# Patient Record
Sex: FEMALE | Race: WHITE | NOT HISPANIC OR LATINO | URBAN - METROPOLITAN AREA
[De-identification: names, ages, dates, MRNs, and addresses within clinical notes are randomized per-mention and may not be internally consistent; named-entity substitution may affect disease eponyms.]

---

## 2023-04-12 ENCOUNTER — OFFICE VISIT (OUTPATIENT)
Dept: PRIMARY CARE | Facility: CLINIC | Age: 38
End: 2023-04-12
Payer: COMMERCIAL

## 2023-04-12 VITALS
HEIGHT: 65 IN | BODY MASS INDEX: 31.49 KG/M2 | TEMPERATURE: 98.3 F | DIASTOLIC BLOOD PRESSURE: 65 MMHG | OXYGEN SATURATION: 95 % | HEART RATE: 87 BPM | WEIGHT: 189 LBS | SYSTOLIC BLOOD PRESSURE: 100 MMHG

## 2023-04-12 DIAGNOSIS — R53.83 FATIGUE, UNSPECIFIED TYPE: ICD-10-CM

## 2023-04-12 DIAGNOSIS — R06.02 SOB (SHORTNESS OF BREATH): Primary | ICD-10-CM

## 2023-04-12 DIAGNOSIS — R51.9 INCREASED FREQUENCY OF HEADACHES: ICD-10-CM

## 2023-04-12 DIAGNOSIS — R00.0 TACHYCARDIA: ICD-10-CM

## 2023-04-12 DIAGNOSIS — R11.0 NAUSEA: ICD-10-CM

## 2023-04-12 PROBLEM — E28.2 PCOS (POLYCYSTIC OVARIAN SYNDROME): Status: RESOLVED | Noted: 2023-04-12 | Resolved: 2023-04-12

## 2023-04-12 PROBLEM — J45.20 MILD INTERMITTENT ASTHMA WITHOUT COMPLICATION (HHS-HCC): Status: RESOLVED | Noted: 2023-04-12 | Resolved: 2023-04-12

## 2023-04-12 PROBLEM — F41.9 ANXIETY: Status: ACTIVE | Noted: 2023-04-12

## 2023-04-12 PROBLEM — F32.A DEPRESSION: Status: ACTIVE | Noted: 2023-04-12

## 2023-04-12 PROBLEM — E28.2 PCOS (POLYCYSTIC OVARIAN SYNDROME): Status: ACTIVE | Noted: 2023-04-12

## 2023-04-12 PROBLEM — F32.A DEPRESSION: Status: RESOLVED | Noted: 2023-04-12 | Resolved: 2023-04-12

## 2023-04-12 PROBLEM — F41.9 ANXIETY: Status: RESOLVED | Noted: 2023-04-12 | Resolved: 2023-04-12

## 2023-04-12 PROBLEM — J45.20 MILD INTERMITTENT ASTHMA WITHOUT COMPLICATION (HHS-HCC): Status: ACTIVE | Noted: 2023-04-12

## 2023-04-12 LAB
NON-UH HIE A/G RATIO: 1.5
NON-UH HIE ALB: 4.1 G/DL (ref 3.4–5)
NON-UH HIE ALK PHOS: 47 UNIT/L (ref 46–116)
NON-UH HIE BASO COUNT: 0.09 X1000 (ref 0–0.2)
NON-UH HIE BASOS %: 1.1 %
NON-UH HIE BILIRUBIN, TOTAL: 0.2 MG/DL (ref 0.2–1)
NON-UH HIE BUN/CREAT RATIO: 17.8
NON-UH HIE BUN: 16 MG/DL (ref 9–23)
NON-UH HIE CALCIUM: 9.6 MG/DL (ref 8.7–10.4)
NON-UH HIE CALCULATED OSMOLALITY: 280 MOSM/KG (ref 275–295)
NON-UH HIE CHLORIDE: 107 MMOL/L (ref 98–107)
NON-UH HIE CO2, VENOUS: 26 MMOL/L (ref 20–31)
NON-UH HIE CREATININE: 0.9 MG/DL (ref 0.5–0.8)
NON-UH HIE DIFF?: NO
NON-UH HIE DXH ACTIONS: ABNORMAL
NON-UH HIE EOS COUNT: 0.61 X1000 (ref 0–0.5)
NON-UH HIE EOSIN %: 7.3 %
NON-UH HIE GFR AA: >60
NON-UH HIE GLOBULIN: 2.7 G/DL
NON-UH HIE GLOMERULAR FILTRATION RATE: >60 ML/MIN/1.73M?
NON-UH HIE GLUCOSE: 80 MG/DL (ref 74–106)
NON-UH HIE GOT: 14 UNIT/L (ref 15–37)
NON-UH HIE GPT: 14 UNIT/L (ref 10–49)
NON-UH HIE HCT: 40.2 % (ref 36–46)
NON-UH HIE HGB: 13 G/DL (ref 12–16)
NON-UH HIE INSTR WBC: 8.3
NON-UH HIE K: 4.1 MMOL/L (ref 3.5–5.1)
NON-UH HIE LYMPH %: 29.6 %
NON-UH HIE LYMPH COUNT: 2.46 X1000 (ref 1.2–4.8)
NON-UH HIE MCH: 24.8 PG (ref 27–34)
NON-UH HIE MCHC: 32.3 G/DL (ref 32–37)
NON-UH HIE MCV: 76.9 FL (ref 80–100)
NON-UH HIE MONO %: 5.3 %
NON-UH HIE MONO COUNT: 0.44 X1000 (ref 0.1–1)
NON-UH HIE MPV: 9.2 FL (ref 7.4–10.4)
NON-UH HIE NA: 140 MMOL/L (ref 135–145)
NON-UH HIE NEUTROPHIL %: 56.7 %
NON-UH HIE NEUTROPHIL COUNT (ANC): 4.7 X1000 (ref 1.4–8.8)
NON-UH HIE NUCLEATED RBC: 0 /100WBC
NON-UH HIE PLATELET: 190 X10 (ref 150–450)
NON-UH HIE RBC: 5.23 X10 (ref 4.2–5.4)
NON-UH HIE RDW: 14.1 % (ref 11.5–14.5)
NON-UH HIE TOTAL PROTEIN: 6.8 G/DL (ref 5.7–8.2)
NON-UH HIE TSH: 0.88 UIU/ML (ref 0.55–4.78)
NON-UH HIE VIT D 25: 22 NG/ML
NON-UH HIE VITAMIN B12: 303 PG/ML (ref 211–911)
NON-UH HIE WBC: 8.3 X10 (ref 4.5–11)

## 2023-04-12 PROCEDURE — 99204 OFFICE O/P NEW MOD 45 MIN: CPT | Performed by: NURSE PRACTITIONER

## 2023-04-12 PROCEDURE — 93000 ELECTROCARDIOGRAM COMPLETE: CPT | Performed by: NURSE PRACTITIONER

## 2023-04-12 PROCEDURE — 1036F TOBACCO NON-USER: CPT | Performed by: NURSE PRACTITIONER

## 2023-04-12 RX ORDER — RIZATRIPTAN BENZOATE 10 MG/1
10 TABLET, ORALLY DISINTEGRATING ORAL ONCE AS NEEDED
Qty: 12 TABLET | Refills: 5 | Status: SHIPPED | OUTPATIENT
Start: 2023-04-12 | End: 2023-05-12

## 2023-04-12 RX ORDER — KETOROLAC TROMETHAMINE 30 MG/ML
60 INJECTION, SOLUTION INTRAMUSCULAR; INTRAVENOUS ONCE
Status: CANCELLED | OUTPATIENT
Start: 2023-04-12 | End: 2023-04-12

## 2023-04-12 RX ORDER — FAMOTIDINE 40 MG/1
TABLET, FILM COATED ORAL
COMMUNITY
Start: 2021-12-22

## 2023-04-12 RX ORDER — ONDANSETRON 4 MG/1
4 TABLET, FILM COATED ORAL EVERY 8 HOURS PRN
Qty: 20 TABLET | Refills: 0 | Status: SHIPPED | OUTPATIENT
Start: 2023-04-12 | End: 2023-04-19

## 2023-04-12 RX ORDER — PANTOPRAZOLE SODIUM 40 MG/1
FOR SUSPENSION ORAL
COMMUNITY

## 2023-04-12 ASSESSMENT — ANXIETY QUESTIONNAIRES
7. FEELING AFRAID AS IF SOMETHING AWFUL MIGHT HAPPEN: NOT AT ALL
GAD7 TOTAL SCORE: 0
5. BEING SO RESTLESS THAT IT IS HARD TO SIT STILL: NOT AT ALL
6. BECOMING EASILY ANNOYED OR IRRITABLE: NOT AT ALL
1. FEELING NERVOUS, ANXIOUS, OR ON EDGE: NOT AT ALL
4. TROUBLE RELAXING: NOT AT ALL
IF YOU CHECKED OFF ANY PROBLEMS ON THIS QUESTIONNAIRE, HOW DIFFICULT HAVE THESE PROBLEMS MADE IT FOR YOU TO DO YOUR WORK, TAKE CARE OF THINGS AT HOME, OR GET ALONG WITH OTHER PEOPLE: NOT DIFFICULT AT ALL
2. NOT BEING ABLE TO STOP OR CONTROL WORRYING: NOT AT ALL
3. WORRYING TOO MUCH ABOUT DIFFERENT THINGS: NOT AT ALL

## 2023-04-12 ASSESSMENT — ENCOUNTER SYMPTOMS
PALPITATIONS: 0
COUGH: 0
FATIGUE: 1
HEADACHES: 1
SHORTNESS OF BREATH: 1
CHILLS: 0
WHEEZING: 0
VOMITING: 1
FEVER: 0
DIZZINESS: 1

## 2023-04-12 ASSESSMENT — PATIENT HEALTH QUESTIONNAIRE - PHQ9
6. FEELING BAD ABOUT YOURSELF - OR THAT YOU ARE A FAILURE OR HAVE LET YOURSELF OR YOUR FAMILY DOWN: NOT AT ALL
3. TROUBLE FALLING OR STAYING ASLEEP OR SLEEPING TOO MUCH: MORE THAN HALF THE DAYS
7. TROUBLE CONCENTRATING ON THINGS, SUCH AS READING THE NEWSPAPER OR WATCHING TELEVISION: NOT AT ALL
10. IF YOU CHECKED OFF ANY PROBLEMS, HOW DIFFICULT HAVE THESE PROBLEMS MADE IT FOR YOU TO DO YOUR WORK, TAKE CARE OF THINGS AT HOME, OR GET ALONG WITH OTHER PEOPLE: SOMEWHAT DIFFICULT
2. FEELING DOWN, DEPRESSED OR HOPELESS: NOT AT ALL
SUM OF ALL RESPONSES TO PHQ9 QUESTIONS 1 AND 2: 0
5. POOR APPETITE OR OVEREATING: NOT AT ALL
4. FEELING TIRED OR HAVING LITTLE ENERGY: MORE THAN HALF THE DAYS
SUM OF ALL RESPONSES TO PHQ QUESTIONS 1-9: 4
2. FEELING DOWN, DEPRESSED OR HOPELESS: NOT AT ALL
1. LITTLE INTEREST OR PLEASURE IN DOING THINGS: NOT AT ALL
1. LITTLE INTEREST OR PLEASURE IN DOING THINGS: NOT AT ALL
8. MOVING OR SPEAKING SO SLOWLY THAT OTHER PEOPLE COULD HAVE NOTICED. OR THE OPPOSITE, BEING SO FIGETY OR RESTLESS THAT YOU HAVE BEEN MOVING AROUND A LOT MORE THAN USUAL: NOT AT ALL
SUM OF ALL RESPONSES TO PHQ9 QUESTIONS 1 AND 2: 0
9. THOUGHTS THAT YOU WOULD BE BETTER OFF DEAD, OR OF HURTING YOURSELF: NOT AT ALL

## 2023-04-12 NOTE — PROGRESS NOTES
Subjective   Patient ID: Lydia Flores is a 37 y.o. female who presents for Migraine and Fatigue (Migraine started about 3-4 weeks ago vomiting, dizzy, sensitive to the light. Tired all the time, started about 2months./Last pcp Sonny not pulling up/Ob gyn do not have one/Last physical -10/022/Fasting-No/Tdap-2011/Flu shot-09/2022/Last pap-2018/Bp-sometimes when she feel like its high/).    new pt-johanna-last pcp-sonny; no ob gyn; sees dr swan    last physical 10/2022  last labs 9/2022 low k, cbc nl; 8/2022 iron nl, a1c 5.2, tsh nl  is pt fasting? no  Tdap 2011; pt will check paperwork at home  flu shot unavailable  last pap 2018; had hyster with cervix out  Bps at home -checks at times  Family history form  Phq9=6, gad7=0      Do ekg if she hasn't had one in the last 1wk      HPI  Tired all the time x 2mon.  No new stresses  In a good place with work and family x 1 1/2 yrs  Able to fall asleep and stay asleep.    On off tachycardia  Sob when exert self  Bps not high when checking; highest 130 systolic  No wheezing, tight chest, or cough  No fever or chills  No chest pain, skips, pause, flutters  No double vision or blurry vision    Migraine started 3-4wks ago-daily.  Also vomiting, dizzy, sensitive to light  h/o migraines on off  7/2022 getting on a daily basis but then went away    Location-frontal, behind eyes and temples  how many days of work/school missed in the last mon-  No aura  Wakes up with headache mostly  no fall or injury  Pain right now 0/10  Pain at worst 10/10  pain worse with light, noise, walking around  pain better with darkness and sleep  worse with activity or exertion    neck pain none  Assoc sx: dizziness, tingling face or hands, runny nose, stuffy nose, post nasal drip, ST, ear pressure, ear pain, fever, chills, cp,  wheezing  no trouble speaking, difficulty understanding speech, slurring words, confusion, paralysis/numbness face, arm, leg, facial drooping, loss of  balance/coordination, gait disturbance, weakness  no fainting or stiff neck or seizures  Last vomit-Monday  Works night shift-nurse  seasonal allergies-none    No new diet  eats 2 meals a day  fluids  Caffeine-quit 2021; quit smoking 2021  new meds or otc meds  FH headaches-none  Selftxt: excedrin migraine, ibuprofen, sleep, ice  No rx meds in the past     Lmp 2015; hyster      No results found for this or any previous visit (from the past 4464 hour(s)).     No care team member to display     Review of Systems   Constitutional:  Positive for fatigue. Negative for chills and fever.   Eyes:         Sensitive to light   Respiratory:  Positive for shortness of breath. Negative for cough and wheezing.    Cardiovascular:  Negative for chest pain and palpitations.        Heart racing   Gastrointestinal:  Positive for vomiting.   Neurological:  Positive for dizziness and headaches.       Objective   Visit Vitals  /65   Pulse 87   Temp 36.8 °C (98.3 °F) (Oral)      BP Readings from Last 3 Encounters:   04/12/23 100/65     Wt Readings from Last 3 Encounters:   04/12/23 85.7 kg (189 lb)       Physical Exam  Constitutional:       General: She is not in acute distress.     Appearance: Normal appearance.   Neurological:      Mental Status: She is alert.         Assessment/Plan   Diagnoses and all orders for this visit:  SOB (shortness of breath)  Tachycardia  -     ECG 12 lead  Fatigue, unspecified type  -     Comprehensive Metabolic Panel; Future  -     TSH with reflex to Free T4 if abnormal; Future  -     CBC and Auto Differential; Future  -     Vitamin D 25-Hydroxy,Total; Future  -     Vitamin B12; Future  Increased frequency of headaches  -     CT head wo IV contrast; Future  -     Referral to Neurology; Future  -     rizatriptan MLT (Maxalt-MLT) 10 mg disintegrating tablet; Take 1 tablet (10 mg) by mouth 1 time if needed for migraine. May repeat in 2 hours if unresolved. Do not exceed 30 mg in 24 hours.  Nausea  -      ondansetron (Zofran) 4 mg tablet; Take 1 tablet (4 mg) by mouth every 8 hours if needed for nausea or vomiting for up to 7 days.

## 2023-04-12 NOTE — PATIENT INSTRUCTIONS
Zyrtec 1 a day x 2wks  Consider antibiotic  rx maxalt for migraine onset  Rx zofran for nausea/vomiting  ct head  neuro dr    Check date of last tdap at home  If more than 10yrs ago, let me know if you would like to update this.    EKG today-normal    Labs today-results in 2-3d  You can use the lab in our office today. Results will be back in 2-3 business days for most labs. It is always recommended for any orders (labs, xrays, ultrasounds,MRI, ct scan, procedures etc) to check with your insurance provider for expected costs or expenses to you.     You will get your results via phone from my medical assistant if you do not have [employer].  OR  You will get your results via the [employer] patient portal.    If a result is urgent, I will call to speak to you.        headache diary to see if there is a pattern          -foods-nitrites (ball, sausage, hot dogs), MSG, aged cheeses like cheddar, caffeine, alcohol especially red wine, chocolate          -bright sunlight-wear sunglasses          -strong smells (perfume, gas, smoke)          -smoking cigarettes          -skipping meals-especially breakfast          -over or under sleeping          -weather changes          -not drinking enough non-caffeinated fluids          -stress          -periods          -illness           OTC prevention meds: Magnesium 400mg a day. If causes loose stools, stop the med.  Riboflavin B2 200mg 1 in the morning.     Headache prevention strategies-get sleep 8hrs per night, eat regular meals 3 times a day, stay well hydrated, decrease caffeine to 1-2 servings a day, avoid caffeine in the evening, minimize stress, avoid using electronic devices at least 2hrs before bed, do moderate exercise, limit tylenol and ibuprofen/aleve to no more than 3 times a wk  Nondrug strateiges for relief-apply heat pack to back of neck, hot shower, relaxation techniques-yoga, music, avoid stressful situations, loud noices, bright lights and vigorous activity until  headache improves, take a nap  consider biofeedback training, physical therapy, or acupuncture      Return for physical      I will communicate with you via [employer] regarding messages and results. If you need help with this, you can call the support line at [phone].    IT WAS A PLEASURE TO SEE YOU TODAY. THANK YOU FOR CHOOSING US FOR YOUR HEALTHCARE NEEDS.

## 2023-04-14 DIAGNOSIS — E55.9 VITAMIN D DEFICIENCY: Primary | ICD-10-CM

## 2023-04-14 RX ORDER — CHOLECALCIFEROL (VITAMIN D3) 50 MCG
50 TABLET ORAL DAILY
Qty: 30 TABLET | Refills: 11
Start: 2023-04-14 | End: 2023-10-18 | Stop reason: ALTCHOICE

## 2023-07-18 ENCOUNTER — OFFICE VISIT (OUTPATIENT)
Dept: PRIMARY CARE | Facility: CLINIC | Age: 38
End: 2023-07-18
Payer: COMMERCIAL

## 2023-07-18 VITALS
WEIGHT: 190 LBS | DIASTOLIC BLOOD PRESSURE: 84 MMHG | HEIGHT: 65 IN | HEART RATE: 131 BPM | OXYGEN SATURATION: 96 % | SYSTOLIC BLOOD PRESSURE: 130 MMHG | BODY MASS INDEX: 31.65 KG/M2

## 2023-07-18 DIAGNOSIS — R07.89 ATYPICAL CHEST PAIN: Primary | ICD-10-CM

## 2023-07-18 LAB
NON-UH HIE A/G RATIO: 1.7
NON-UH HIE ALB: 4.5 G/DL (ref 3.4–5)
NON-UH HIE ALK PHOS: 53 UNIT/L (ref 46–116)
NON-UH HIE BASO COUNT: 0.06 X1000 (ref 0–0.2)
NON-UH HIE BASOS %: 0.9 %
NON-UH HIE BILIRUBIN, TOTAL: 0.3 MG/DL (ref 0.2–1)
NON-UH HIE BUN/CREAT RATIO: 11.2
NON-UH HIE BUN: 9 MG/DL (ref 9–23)
NON-UH HIE CALCIUM: 9.9 MG/DL (ref 8.7–10.4)
NON-UH HIE CALCULATED OSMOLALITY: 278 MOSM/KG (ref 275–295)
NON-UH HIE CHLORIDE: 109 MMOL/L (ref 98–107)
NON-UH HIE CO2, VENOUS: 24 MMOL/L (ref 20–31)
NON-UH HIE CREATININE: 0.8 MG/DL (ref 0.5–0.8)
NON-UH HIE DIFF?: NO
NON-UH HIE EOS COUNT: 0.36 X1000 (ref 0–0.5)
NON-UH HIE EOSIN %: 4.8 %
NON-UH HIE GFR AA: >60
NON-UH HIE GLOBULIN: 2.7 G/DL
NON-UH HIE GLOMERULAR FILTRATION RATE: >60 ML/MIN/1.73M?
NON-UH HIE GLUCOSE: 87 MG/DL (ref 74–106)
NON-UH HIE GOT: 15 UNIT/L (ref 15–37)
NON-UH HIE GPT: 15 UNIT/L (ref 10–49)
NON-UH HIE HCT: 39.4 % (ref 36–46)
NON-UH HIE HGB: 12.8 G/DL (ref 12–16)
NON-UH HIE INSTR WBC: 7.6
NON-UH HIE K: 3.6 MMOL/L (ref 3.5–5.1)
NON-UH HIE LYMPH %: 25.9 %
NON-UH HIE LYMPH COUNT: 1.96 X1000 (ref 1.2–4.8)
NON-UH HIE MCH: 25 PG (ref 27–34)
NON-UH HIE MCHC: 32.5 G/DL (ref 32–37)
NON-UH HIE MCV: 76.9 FL (ref 80–100)
NON-UH HIE MONO %: 6.6 %
NON-UH HIE MONO COUNT: 0.5 X1000 (ref 0.1–1)
NON-UH HIE MPV: 9.1 FL (ref 7.4–10.4)
NON-UH HIE NA: 140 MMOL/L (ref 135–145)
NON-UH HIE NEUTROPHIL %: 61.9 %
NON-UH HIE NEUTROPHIL COUNT (ANC): 4.69 X1000 (ref 1.4–8.8)
NON-UH HIE NUCLEATED RBC: 0 /100WBC
NON-UH HIE PLATELET: 189 X10 (ref 150–450)
NON-UH HIE RBC: 5.13 X10 (ref 4.2–5.4)
NON-UH HIE RDW: 14.5 % (ref 11.5–14.5)
NON-UH HIE TOTAL PROTEIN: 7.2 G/DL (ref 5.7–8.2)
NON-UH HIE TSH: 1.39 UIU/ML (ref 0.55–4.78)
NON-UH HIE WBC: 7.6 X10 (ref 4.5–11)

## 2023-07-18 PROCEDURE — 1036F TOBACCO NON-USER: CPT | Performed by: NURSE PRACTITIONER

## 2023-07-18 PROCEDURE — 93000 ELECTROCARDIOGRAM COMPLETE: CPT | Performed by: NURSE PRACTITIONER

## 2023-07-18 PROCEDURE — 99214 OFFICE O/P EST MOD 30 MIN: CPT | Performed by: NURSE PRACTITIONER

## 2023-07-18 ASSESSMENT — ENCOUNTER SYMPTOMS
PALPITATIONS: 1
LIGHT-HEADEDNESS: 1
CONSTITUTIONAL NEGATIVE: 1
WEAKNESS: 0
SHORTNESS OF BREATH: 0
WHEEZING: 0

## 2023-07-18 NOTE — PROGRESS NOTES
"Subjective   Patient ID: Lydia Flores is a 37 y.o. female who presents for Heart Problem.  Last physical:  10/2022    Do ekg    HPI  Low HR episode (46) with sweating, stabbing cp and light headed; feels like heart is stopping,  feels like heart is quivering.then sx stop in a min  No sob and wheezing  No vision change  No sx rest of day  Started July 3rd  Location-mid and slightly to the right  These episodes happen once a day and lasts a minute    trigger to start-random  Quit smoking 2021  No coffee or energy drinks  Does not exercise  no sudden onset of severe chest or back pain, described as \"tearing\"   no sudden difficulty speaking, facial drooping, confusion, or drooling  worse with activity  bps at home-none  no weakness/paralysis 1 side of body, loss of consciousness, n/v, pain with deep breath or cough, vision change, HA, edema  no leg pain or difficulty walking  no fall or injury  no new work or exercise routine  no new stresses  no rash  no heartburn, gas, belching, bloating, abd pain, trouble swallowing  does not hurt to touch chest  pain right now 0/10  pain at episode 8-9/10  FH heart attack or heart surgery or other heart issues-family history unknown  does not have a cardio dr  no sx like this before  no diabetes or htn      Encounter Date: 07/18/23   ECG 12 lead    Narrative    Tachy, nonspecific ST abnormality         No care team member to display     Review of Systems   Constitutional: Negative.    Eyes:  Negative for visual disturbance.   Respiratory:  Negative for shortness of breath and wheezing.    Cardiovascular:  Positive for palpitations. Negative for chest pain and leg swelling.   Neurological:  Positive for light-headedness. Negative for weakness.       Objective   Visit Vitals  BP (!) 155/108   Pulse (!) 131      BP Readings from Last 3 Encounters:   07/18/23 (!) 155/108   04/12/23 100/65     Wt Readings from Last 3 Encounters:   07/18/23 86.2 kg (190 lb)   04/12/23 85.7 kg (189 " lb)       Physical Exam  Constitutional:       General: She is not in acute distress.     Appearance: Normal appearance.   Cardiovascular:      Rate and Rhythm: Normal rate and regular rhythm.      Heart sounds: No murmur heard.  Pulmonary:      Effort: Pulmonary effort is normal.      Breath sounds: Normal breath sounds. No wheezing, rhonchi or rales.   Neurological:      Mental Status: She is alert.       Assessment/Plan   Diagnoses and all orders for this visit:  Atypical chest pain  -     ECG 12 lead  -     Comprehensive Metabolic Panel; Future  -     CBC and Auto Differential; Future  -     TSH with reflex to Free T4 if abnormal; Future  -     XR chest 2 views; Future  -     Referral to Cardiology; Future  Other orders  -     Follow Up In Primary Care - Health Maintenance; Future

## 2023-07-18 NOTE — PATIENT INSTRUCTIONS
EKG today-abnormal  Chest xray today-results in 2-3d  Refer to cardio    Labs today-results in 2-3d  Lab orders placed. You can use the lab in our office today. Results will be back in 2-3 business days for most labs. It is always recommended for any orders (labs, xrays, ultrasounds,MRI, ct scan, procedures etc) to check with your insurance provider for expected costs or expenses to you.     You will get your results via phone from my medical assistant if you do not have [email or web address].  OR  You will get your results via the [email or web address] patient portal.    If a result is urgent, I will call to speak to you.    Return in nov for wellness    I will communicate with you via [email or web address] regarding messages and results. If you need help with this, you can call the support line at [phone].    IT WAS A PLEASURE TO SEE YOU TODAY. THANK YOU FOR CHOOSING US FOR YOUR HEALTHCARE NEEDS.

## 2023-10-16 PROBLEM — E66.9 OBESITY WITH BODY MASS INDEX 30 OR GREATER: Status: ACTIVE | Noted: 2023-10-16

## 2023-10-16 PROBLEM — F43.12 CHRONIC POST-TRAUMATIC STRESS DISORDER (PTSD): Status: ACTIVE | Noted: 2023-10-16

## 2023-10-16 PROBLEM — N94.6 DYSMENORRHEA: Status: ACTIVE | Noted: 2023-10-16

## 2023-10-16 PROBLEM — J45.909 ASTHMA (HHS-HCC): Status: ACTIVE | Noted: 2023-10-16

## 2023-10-16 PROBLEM — K44.9 HIATAL HERNIA: Status: ACTIVE | Noted: 2023-10-16

## 2023-10-16 PROBLEM — K21.9 GASTROESOPHAGEAL REFLUX DISEASE: Status: ACTIVE | Noted: 2023-10-16

## 2023-10-16 PROBLEM — K20.0 EOSINOPHILIC ESOPHAGITIS: Status: ACTIVE | Noted: 2023-10-16

## 2023-10-16 PROBLEM — R53.83 FATIGUE: Status: ACTIVE | Noted: 2023-10-16

## 2023-10-16 PROBLEM — F17.200 NICOTINE DEPENDENCE: Status: ACTIVE | Noted: 2023-10-16

## 2023-10-16 PROBLEM — F41.9 ANXIETY AND DEPRESSION: Status: ACTIVE | Noted: 2023-10-16

## 2023-10-16 PROBLEM — M54.16 LEFT LUMBAR RADICULOPATHY: Status: ACTIVE | Noted: 2022-09-07

## 2023-10-16 PROBLEM — E55.9 VITAMIN D DEFICIENCY: Status: ACTIVE | Noted: 2023-10-16

## 2023-10-16 PROBLEM — R22.0 JAW SWELLING: Status: ACTIVE | Noted: 2023-10-16

## 2023-10-16 PROBLEM — F32.9 MAJOR DEPRESSIVE DISORDER: Status: ACTIVE | Noted: 2023-10-16

## 2023-10-16 PROBLEM — F32.A ANXIETY AND DEPRESSION: Status: ACTIVE | Noted: 2023-10-16

## 2023-10-16 PROBLEM — M25.569 JOINT PAIN, KNEE: Status: ACTIVE | Noted: 2023-10-16

## 2023-10-16 PROBLEM — F41.1 GENERALIZED ANXIETY DISORDER: Status: ACTIVE | Noted: 2023-10-16

## 2023-10-16 PROBLEM — F31.9 BIPOLAR DISORDER (MULTI): Status: ACTIVE | Noted: 2023-10-16

## 2023-10-16 RX ORDER — IBUPROFEN 200 MG
TABLET ORAL
COMMUNITY
Start: 2005-12-22 | End: 2023-10-18 | Stop reason: ALTCHOICE

## 2023-10-16 RX ORDER — DEXTROAMPHETAMINE SACCHARATE, AMPHETAMINE ASPARTATE, DEXTROAMPHETAMINE SULFATE AND AMPHETAMINE SULFATE 1.25; 1.25; 1.25; 1.25 MG/1; MG/1; MG/1; MG/1
TABLET ORAL
COMMUNITY
End: 2023-10-18 | Stop reason: ALTCHOICE

## 2023-10-16 RX ORDER — NICOTINE 7MG/24HR
PATCH, TRANSDERMAL 24 HOURS TRANSDERMAL
COMMUNITY
Start: 2005-12-22 | End: 2023-10-18 | Stop reason: ALTCHOICE

## 2023-10-16 RX ORDER — ALBUTEROL SULFATE 90 UG/1
AEROSOL, METERED RESPIRATORY (INHALATION)
COMMUNITY
End: 2023-10-18 | Stop reason: ALTCHOICE

## 2023-10-16 RX ORDER — NORELGESTROMIN AND ETHINYL ESTRADIOL 35; 150 UG/MG; UG/MG
PATCH TRANSDERMAL
COMMUNITY
Start: 2005-09-12 | End: 2023-10-18 | Stop reason: ALTCHOICE

## 2023-10-16 RX ORDER — BUPROPION HYDROCHLORIDE 100 MG/1
TABLET, EXTENDED RELEASE ORAL
COMMUNITY
End: 2023-10-18 | Stop reason: ALTCHOICE

## 2023-10-18 ENCOUNTER — HOSPITAL ENCOUNTER (OUTPATIENT)
Dept: CARDIOLOGY | Facility: CLINIC | Age: 38
Discharge: HOME | End: 2023-10-18
Payer: COMMERCIAL

## 2023-10-18 ENCOUNTER — OFFICE VISIT (OUTPATIENT)
Dept: CARDIOLOGY | Facility: CLINIC | Age: 38
End: 2023-10-18
Payer: COMMERCIAL

## 2023-10-18 VITALS
OXYGEN SATURATION: 99 % | HEART RATE: 89 BPM | HEIGHT: 65 IN | SYSTOLIC BLOOD PRESSURE: 138 MMHG | DIASTOLIC BLOOD PRESSURE: 91 MMHG | BODY MASS INDEX: 31.99 KG/M2 | WEIGHT: 192.01 LBS

## 2023-10-18 DIAGNOSIS — R55 VASOVAGAL NEAR SYNCOPE: ICD-10-CM

## 2023-10-18 DIAGNOSIS — R00.2 PALPITATIONS: ICD-10-CM

## 2023-10-18 DIAGNOSIS — R00.2 PALPITATIONS: Primary | ICD-10-CM

## 2023-10-18 DIAGNOSIS — R07.9 CHEST PAIN, UNSPECIFIED TYPE: Primary | ICD-10-CM

## 2023-10-18 PROCEDURE — 1036F TOBACCO NON-USER: CPT | Performed by: INTERNAL MEDICINE

## 2023-10-18 PROCEDURE — 93246 EXT ECG>7D<15D RECORDING: CPT

## 2023-10-18 PROCEDURE — 99213 OFFICE O/P EST LOW 20 MIN: CPT | Mod: 25 | Performed by: INTERNAL MEDICINE

## 2023-10-18 PROCEDURE — 93005 ELECTROCARDIOGRAM TRACING: CPT

## 2023-10-18 PROCEDURE — 99203 OFFICE O/P NEW LOW 30 MIN: CPT | Performed by: INTERNAL MEDICINE

## 2023-10-18 PROCEDURE — 93010 ELECTROCARDIOGRAM REPORT: CPT | Performed by: INTERNAL MEDICINE

## 2023-10-18 ASSESSMENT — PAIN SCALES - GENERAL: PAINLEVEL: 0-NO PAIN

## 2023-10-18 NOTE — PROGRESS NOTES
"Subjective   Lydia Flores is a 38 y.o. female who presents to the [facility] for evaluation of vasovagal near syncope and palpitations.     She notes episodes of bradycardia, lightheadedness, diaphoresis that then lead to a palpitation feeling after recovery. Can occur any time of day without pattern. Otherwise, no active cardiac symptoms of chest pain, dyspnea on exertion, PND, orthopnea, DENYS, yasir syncope, or claudication.     Past Medical History:  1. Former tobacco use (quit in 2022)  2. GERD  3. PCOS    Social History:  Former tobacco use (quit in 2022). Works as a nurse.    Family History:  No premature CAD in 1st degree relatives ( 55 years of age for male relatives,  65 years of age for female relatives)     Review of Systems    A 14 point review of systems was asked. All questions were negative except for pertinent positives listed in the HPI.      Objective   Physical Exam  BP Readings from Last 3 Encounters:   10/18/23 (!) 138/91   07/18/23 130/84   04/12/23 100/65      Wt Readings from Last 3 Encounters:   10/18/23 87.1 kg (192 lb 0.1 oz)   07/18/23 86.2 kg (190 lb)   04/12/23 85.7 kg (189 lb)      BMI: Estimated body mass index is 31.95 kg/m² as calculated from the following:    Height as of this encounter: 1.651 m (5' 5\").    Weight as of this encounter: 87.1 kg (192 lb 0.1 oz).  BSA: Estimated body surface area is 2 meters squared as calculated from the following:    Height as of this encounter: 1.651 m (5' 5\").    Weight as of this encounter: 87.1 kg (192 lb 0.1 oz).    General: no acute distress  HEENT: EOMI, no scleral icterus.  Lungs: Clear to auscultation bilaterally without wheezing, rales, or rhonchi.  Cardiovascular: Regular rhythm and rate. Normal S1 and S2. No murmurs, rubs, or gallops are appreciated. JVP normal.  Abdomen: Soft, nontender, nondistended. Bowel sounds present.  Extremities: Warm and well perfused with equal 2+ pulses bilaterally.  No edema " present.  Neurologic: Alert and oriented x3.    I have personally reviewed the following images and laboratory findings:  Last echocardiogram:    Last cath / stress test:    Most recent ECG: 10/18/2023 ECG: Sinus rhythm, 87 bpm, V1 Q wave due to body habitus positioning, normal variant ECG. Personally reviewed in office.     Assessment/Plan   1. Vasovagal near syncope:  Fluid and hydration instructions given to patient. Can prescribe compression stockings (20-30 mm Hg, knee high) as next step of management. Bradycardia and diaphoresis with lightheadedness due to vagal nerve activation. No yasir syncope.    2. Palpitations:  Check 7 day heart rate monitor to assess average heart rate and heart rate variability due to elevated resting HR during day time and near syncope events.           SIGNATURE: Perry Diaz MD PATIENT NAME: Lydia Flores   DATE/TIME: October 18, 2023 1:39 PM MRN: [id number]

## 2023-10-18 NOTE — PATIENT INSTRUCTIONS
You were seen in the [facility] for your near passing out episode (near syncope).    Your bradycardia and lightheadedness episodes are likely due to vasovagal near syncope. Vasovagal episodes happen when the vagal tone is high (this is the opposite of the adrenaline system, your vagal  tone relaxes blood vessels). Relaxed blood vessels can cause passing out because of not enough blood being pumped up to the brain.    I recommend the following steps to address vasovagal reactions:  1. Drink at least 8 8 ounce glasses of water a day including a 16 ounce glass at the bedside to drink first thing in the morning.     2. If you feel lightheadedness episodes during the day, eat or drink 200-300 mg of sodium from an electrolyte solution or eat a salty snack such as pretzels or salted peanuts and drink 12-16 ounces of fluids to help the  symptoms go away by expanding your blood volume. If they get worse, sit down if standing or lay down if sitting and contract your leg muscles to increase blood flow back to the  heart.    3. When you stand up from sitting down for a long period of time.  place for 30-60 seconds and contract your leg muscles to get the blood flowing back to your heart and  head.    4. Moderate intensity aerobic exercise as is good for training the vagal tone. Train for 30 minutes 3 times a week.    5. If the above steps are not enough, we can prescribe you 20-20 mm Hg knee high compression stockings. You can wear compression stockings on days when you will be sitting for long periods of time. These stockings will help prevent blood from pooling in your leg veins and help  keep the blood flowing toward your head when you stand up.    For your episodes of bradycardia and racing heart rate palpitation feeling, I am ordering a 7 day heart rate monitor for you to wear.    Follow up to be determined by 7 day heart rate monitor result.

## 2023-10-31 LAB
ATRIAL RATE: 87 BPM
P AXIS: 51 DEGREES
P OFFSET: 199 MS
P ONSET: 156 MS
PR INTERVAL: 124 MS
Q ONSET: 218 MS
QRS COUNT: 15 BEATS
QRS DURATION: 78 MS
QT INTERVAL: 360 MS
QTC CALCULATION(BAZETT): 433 MS
QTC FREDERICIA: 407 MS
R AXIS: 45 DEGREES
T AXIS: 55 DEGREES
T OFFSET: 398 MS
VENTRICULAR RATE: 87 BPM

## 2023-11-02 ENCOUNTER — APPOINTMENT (OUTPATIENT)
Dept: PRIMARY CARE | Facility: CLINIC | Age: 38
End: 2023-11-02
Payer: COMMERCIAL

## 2023-11-06 ENCOUNTER — APPOINTMENT (OUTPATIENT)
Dept: PRIMARY CARE | Facility: CLINIC | Age: 38
End: 2023-11-06
Payer: COMMERCIAL

## 2023-11-17 ENCOUNTER — APPOINTMENT (OUTPATIENT)
Dept: PRIMARY CARE | Facility: CLINIC | Age: 38
End: 2023-11-17
Payer: COMMERCIAL

## 2023-11-29 ENCOUNTER — APPOINTMENT (OUTPATIENT)
Dept: PRIMARY CARE | Facility: CLINIC | Age: 38
End: 2023-11-29
Payer: COMMERCIAL

## 2024-01-08 LAB — BODY SURFACE AREA: 2 M2

## 2024-01-08 PROCEDURE — 93248 EXT ECG>7D<15D REV&INTERPJ: CPT | Performed by: INTERNAL MEDICINE

## 2024-01-31 ENCOUNTER — TELEPHONE (OUTPATIENT)
Dept: CARDIOLOGY | Facility: CLINIC | Age: 39
End: 2024-01-31
Payer: COMMERCIAL

## 2024-01-31 NOTE — TELEPHONE ENCOUNTER
Left VM for patient regarding results of holter monitor from October. Per Dr. Diaz everything looks normal. Patient can make follow up visit with him for this spring. Provided scheduling number and nurse line with any questions.

## 2024-02-16 ENCOUNTER — APPOINTMENT (OUTPATIENT)
Dept: NEUROLOGY | Facility: CLINIC | Age: 39
End: 2024-02-16
Payer: COMMERCIAL

## 2024-03-20 ENCOUNTER — APPOINTMENT (OUTPATIENT)
Dept: PRIMARY CARE | Facility: CLINIC | Age: 39
End: 2024-03-20
Payer: COMMERCIAL

## 2024-04-10 ENCOUNTER — APPOINTMENT (OUTPATIENT)
Dept: PRIMARY CARE | Facility: CLINIC | Age: 39
End: 2024-04-10
Payer: COMMERCIAL

## 2024-05-08 ENCOUNTER — APPOINTMENT (OUTPATIENT)
Dept: PRIMARY CARE | Facility: CLINIC | Age: 39
End: 2024-05-08
Payer: COMMERCIAL

## 2024-05-08 PROBLEM — F32.A ANXIETY AND DEPRESSION: Status: RESOLVED | Noted: 2023-10-16 | Resolved: 2024-05-08

## 2024-05-08 PROBLEM — M25.569 JOINT PAIN, KNEE: Status: RESOLVED | Noted: 2023-10-16 | Resolved: 2024-05-08

## 2024-05-08 PROBLEM — M54.16 LEFT LUMBAR RADICULOPATHY: Status: RESOLVED | Noted: 2022-09-07 | Resolved: 2024-05-08

## 2024-05-08 PROBLEM — F17.200 NICOTINE DEPENDENCE: Status: RESOLVED | Noted: 2023-10-16 | Resolved: 2024-05-08

## 2024-05-08 PROBLEM — R53.83 FATIGUE: Status: RESOLVED | Noted: 2023-10-16 | Resolved: 2024-05-08

## 2024-05-08 PROBLEM — E66.9 OBESITY WITH BODY MASS INDEX 30 OR GREATER: Status: RESOLVED | Noted: 2023-10-16 | Resolved: 2024-05-08

## 2024-05-08 PROBLEM — R00.2 PALPITATIONS: Status: RESOLVED | Noted: 2023-10-18 | Resolved: 2024-05-08

## 2024-05-08 PROBLEM — F41.9 ANXIETY AND DEPRESSION: Status: RESOLVED | Noted: 2023-10-16 | Resolved: 2024-05-08

## 2024-05-08 PROBLEM — R22.0 JAW SWELLING: Status: RESOLVED | Noted: 2023-10-16 | Resolved: 2024-05-08

## 2024-05-08 ASSESSMENT — ENCOUNTER SYMPTOMS
ADENOPATHY: 0
BACK PAIN: 0
CHILLS: 0
BRUISES/BLEEDS EASILY: 0
TROUBLE SWALLOWING: 0
EYE DISCHARGE: 0
APPETITE CHANGE: 0
CONFUSION: 0
HEMATURIA: 0
HALLUCINATIONS: 0
PALPITATIONS: 0
EYE REDNESS: 0
ABDOMINAL PAIN: 0
BLOOD IN STOOL: 0
NECK PAIN: 0
UNEXPECTED WEIGHT CHANGE: 0
DIZZINESS: 0
WOUND: 0
VOMITING: 0
POLYDIPSIA: 0
DYSURIA: 0
SORE THROAT: 0
FEVER: 0
COUGH: 0
FATIGUE: 0
SHORTNESS OF BREATH: 0
HEADACHES: 0
FREQUENCY: 0
EYE PAIN: 0
POLYPHAGIA: 0

## 2024-05-08 NOTE — PROGRESS NOTES
Subjective   Patient ID: Lydia Flores is a 38 y.o. female who presents for No chief complaint on file..      Is pt fasting?   Does pt see any providers other than care team below:   john Mckeon jenkins, [place], russo  Does pt see a therapist or psychiatrist?    Any forms to fill out?  Does pt have an albuterol inh at home-  If yes, last albuterol inh use-  Last use of maxalt-  Any other questions or concerns that pt wants to discuss today?      No care team member to display    HPI  Last labs-8/2023 cbc nl  7/2023 tsh nl, cmp nl  Due for labs- all    Cholesterol   Date Value Ref Range Status   10/30/2018 178 0 - 199 mg/dL Final     Comment:     .      AGE      DESIRABLE   BORDERLINE HIGH   HIGH     0-19 Y     0 - 169       170 - 199     >/= 200    20-24 Y     0 - 189       190 - 224     >/= 225         >24 Y     0 - 199       200 - 239     >/= 240   **All ranges are based on fasting samples. Specific   therapeutic targets will vary based on patient-specific   cardiac risk.  .   Pediatric guidelines reference:Pediatrics 2011, 128(S5).   Adult guidelines reference: NCEP ATPIII Guidelines,     EVELYN 2001, 258:2486-97  .   Venipuncture immediately after or during the    administration of Metamizole may lead to falsely   low results. Testing should be performed immediately   prior to Metamizole dosing.       Triglycerides   Date Value Ref Range Status   10/30/2018 188 (H) 0 - 149 mg/dL Final     Comment:     .      AGE      DESIRABLE   BORDERLINE HIGH   HIGH     VERY HIGH   0 D-90 D    19 - 174         ----         ----        ----  91 D- 9 Y     0 -  74        75 -  99     >/= 100      ----    10-19 Y     0 -  89        90 - 129     >/= 130      ----    20-24 Y     0 - 114       115 - 149     >/= 150      ----         >24 Y     0 - 149       150 - 199    200- 499    >/= 500  .   Venipuncture immediately after or during the    administration of Metamizole may lead to falsely   low results. Testing should be  "performed immediately   prior to Metamizole dosing.       HDL   Date Value Ref Range Status   10/30/2018 38.1 (A) mg/dL Final     Comment:     .      AGE      VERY LOW   LOW     NORMAL    HIGH       0-19 Y       < 35   < 40     40-45     ----    20-24 Y       ----   < 40       >45     ----      >24 Y       ----   < 40     40-60      >60  .       No results found for: \"LDL\"  TSH   Date Value Ref Range Status   10/30/2018 1.05 0.44 - 3.98 mIU/L Final     Comment:      TSH testing is performed using different testing    methodology at [facility] ([facility]) than at other    [facility]. Direct result comparisons should    only be made within the same method.  .   Patients receiving more than 5 mg/day of biotin may have interference   in test results.  A sample should be taken no sooner than eight hours   after  previous dose. Contact [phone] for additional information.       No results found for: \"A1C\"  No components found for: \"POCA1C\"  No results found for: \"ALBUR\"  No components found for: \"POCALBUR\"      Other concerns:    bps at home- ***    ER/urgicare visits in the last year- 5/3/24 rt upper back pain radiating to rt chest,given toradol  Given meloxicam for home  Ddimer nl, cmp nl, cbc nl, UA neg, xray ribs/chest neg  Hospitalizations in the last year- ***      last Pap-10/17/23; due 10/2028  H/o abn pap-      FH ovarian, cervical, uterine ca-    Current birth control method-  No change in contraception desired    FH br ca-    FH colon ca-    Exercise- ***  Diet-***   There is no height or weight on file to calculate BMI.    last eye dr appt-   No vision issues    last dental appt- ***    BMs-regular  Sleep-able to fall asleep and stay asleep; no snoring or apnea  no cp, swelling, sob, abd pain, n/v/d/c, blood in stool or black stools  STI testing including hiv (age 15-65) and hep c screening (18-79)-***        Immunization History   Administered Date(s) Administered    DTP 1987, 02/11/1988, " 04/18/1988    DTaP, Unspecified 06/26/1991, 05/26/1992    Flu vaccine, quadrivalent, no egg protein, age 6 month or greater (FLUCELVAX) 11/15/2018    HiB, unspecified 06/26/1989    Influenza, Unspecified 11/17/2010    MMR vaccine, subcutaneous (MMR II) 01/19/1989, 08/22/2000, 04/07/2017    Novel influenza-H1N1-09, preservative-free 12/05/2009    Pfizer Purple Cap SARS-CoV-2 11/08/2021, 11/29/2021    Pneumococcal polysaccharide vaccine, 23-valent, age 2 years and older (PNEUMOVAX 23) 08/04/2010    Polio, Unspecified 1987, 04/18/1988, 06/26/1991, 05/26/1993    Tdap vaccine, age 7 year and older (BOOSTRIX, ADACEL) 01/22/2019         fractures in lifetime-***  Anyone with osteoporosis in the family-    FH heart attack, heart surgery-***  FH stroke-***    The ASCVD Risk score (Dominic DK, et al., 2019) failed to calculate for the following reasons:    The 2019 ASCVD risk score is only valid for ages 40 to 79  Coronary Artery Calcium score:  This test is recommended for men 45 or older and women 55 or older without a history of heart disease and have 1 risk factor (high LDL cholesterol, low HDL cholesterol, high blood pressure, smoker (current or past), type 2 diabetes, IBD, lupus, RA, ankylosing spondylitis, psoriasis or family history of  heart disease <55yrs in dad, brother or child or <65yrs in mom, sister, or child.)       Review of Systems   Constitutional:  Negative for appetite change, chills, fatigue, fever and unexpected weight change.   HENT:  Negative for congestion, ear pain, sore throat and trouble swallowing.    Eyes:  Negative for pain, discharge and redness.   Respiratory:  Negative for cough and shortness of breath.    Cardiovascular:  Negative for chest pain and palpitations.   Gastrointestinal:  Negative for abdominal pain, blood in stool and vomiting.   Endocrine: Negative for polydipsia, polyphagia and polyuria.   Genitourinary:  Negative for dysuria, frequency, hematuria and urgency.    Musculoskeletal:  Negative for back pain and neck pain.   Skin:  Negative for rash and wound.   Allergic/Immunologic: Negative for immunocompromised state.   Neurological:  Negative for dizziness, syncope and headaches.   Hematological:  Negative for adenopathy. Does not bruise/bleed easily.   Psychiatric/Behavioral:  Negative for confusion and hallucinations.        Objective   There were no vitals taken for this visit.   BP Readings from Last 3 Encounters:   10/18/23 (!) 138/91   07/18/23 130/84   04/12/23 100/65     Wt Readings from Last 3 Encounters:   10/18/23 87.1 kg (192 lb 0.1 oz)   07/18/23 86.2 kg (190 lb)   04/12/23 85.7 kg (189 lb)           Physical Exam  Constitutional:       General: She is not in acute distress.     Appearance: Normal appearance. She is not ill-appearing.   HENT:      Head: Normocephalic.      Right Ear: Tympanic membrane, ear canal and external ear normal.      Left Ear: Tympanic membrane, ear canal and external ear normal.      Nose: Nose normal.      Mouth/Throat:      Mouth: Mucous membranes are moist.      Pharynx: Oropharynx is clear.   Eyes:      Extraocular Movements: Extraocular movements intact.      Conjunctiva/sclera: Conjunctivae normal.      Pupils: Pupils are equal, round, and reactive to light.   Cardiovascular:      Rate and Rhythm: Normal rate and regular rhythm.      Heart sounds: Normal heart sounds. No murmur heard.  Pulmonary:      Effort: Pulmonary effort is normal. No respiratory distress.      Breath sounds: Normal breath sounds. No wheezing, rhonchi or rales.   Abdominal:      General: Bowel sounds are normal.      Palpations: Abdomen is soft. There is no mass.      Tenderness: There is no abdominal tenderness.   Musculoskeletal:         General: No swelling or tenderness. Normal range of motion.      Cervical back: Normal range of motion and neck supple.      Right lower leg: No edema.      Left lower leg: No edema.   Skin:     General: Skin is warm.       Findings: No rash.   Neurological:      General: No focal deficit present.      Mental Status: She is alert and oriented to person, place, and time.      Cranial Nerves: No cranial nerve deficit.      Motor: No weakness.   Psychiatric:         Mood and Affect: Mood normal.         Behavior: Behavior normal.         Assessment/Plan   {Assess/PlanSmartLinks:[id number]}

## 2024-06-05 ENCOUNTER — APPOINTMENT (OUTPATIENT)
Dept: PRIMARY CARE | Facility: CLINIC | Age: 39
End: 2024-06-05
Payer: COMMERCIAL

## 2024-06-21 ENCOUNTER — APPOINTMENT (OUTPATIENT)
Dept: PRIMARY CARE | Facility: CLINIC | Age: 39
End: 2024-06-21
Payer: COMMERCIAL

## 2024-06-21 ENCOUNTER — HOSPITAL ENCOUNTER (OUTPATIENT)
Dept: RADIOLOGY | Facility: EXTERNAL LOCATION | Age: 39
Discharge: HOME | End: 2024-06-21

## 2024-06-21 VITALS
HEIGHT: 65 IN | BODY MASS INDEX: 31.59 KG/M2 | TEMPERATURE: 98.4 F | SYSTOLIC BLOOD PRESSURE: 131 MMHG | OXYGEN SATURATION: 97 % | WEIGHT: 189.6 LBS | HEART RATE: 110 BPM | DIASTOLIC BLOOD PRESSURE: 89 MMHG

## 2024-06-21 DIAGNOSIS — Z00.00 ROUTINE GENERAL MEDICAL EXAMINATION AT A HEALTH CARE FACILITY: Primary | ICD-10-CM

## 2024-06-21 DIAGNOSIS — N64.4 BREAST PAIN, RIGHT: ICD-10-CM

## 2024-06-21 DIAGNOSIS — N63.13 MASS OF LOWER OUTER QUADRANT OF RIGHT BREAST: ICD-10-CM

## 2024-06-21 DIAGNOSIS — D22.9 SUSPICIOUS NEVUS: ICD-10-CM

## 2024-06-21 DIAGNOSIS — R51.9 INCREASED FREQUENCY OF HEADACHES: ICD-10-CM

## 2024-06-21 LAB
NON-UH HIE A/G RATIO: 1.3
NON-UH HIE ALB: 4.4 G/DL (ref 3.4–5)
NON-UH HIE ALK PHOS: 61 UNIT/L (ref 45–117)
NON-UH HIE BASO COUNT: 0.1 X1000 (ref 0–0.2)
NON-UH HIE BASOS %: 1.2 %
NON-UH HIE BILIRUBIN, TOTAL: 0.4 MG/DL (ref 0.3–1.2)
NON-UH HIE BUN/CREAT RATIO: 13.8
NON-UH HIE BUN: 11 MG/DL (ref 9–23)
NON-UH HIE CALCIUM: 9.9 MG/DL (ref 8.7–10.4)
NON-UH HIE CALCULATED LDL CHOLESTEROL: 108 MG/DL (ref 60–130)
NON-UH HIE CALCULATED OSMOLALITY: 276 MOSM/KG (ref 275–295)
NON-UH HIE CHLORIDE: 105 MMOL/L (ref 98–107)
NON-UH HIE CHOLESTEROL: 199 MG/DL (ref 100–200)
NON-UH HIE CO2, VENOUS: 27 MMOL/L (ref 20–31)
NON-UH HIE CREATININE: 0.8 MG/DL (ref 0.5–0.8)
NON-UH HIE DIFF?: NO
NON-UH HIE EOS COUNT: 0.42 X1000 (ref 0–0.5)
NON-UH HIE EOSIN %: 5.3 %
NON-UH HIE GFR AA: >60
NON-UH HIE GLOBULIN: 3.3 G/DL
NON-UH HIE GLOMERULAR FILTRATION RATE: >60 ML/MIN/1.73M?
NON-UH HIE GLUCOSE: 76 MG/DL (ref 74–106)
NON-UH HIE GOT: 19 UNIT/L (ref 15–37)
NON-UH HIE GPT: 20 UNIT/L (ref 10–49)
NON-UH HIE HCT: 41.1 % (ref 36–46)
NON-UH HIE HDL CHOLESTEROL: 44 MG/DL (ref 40–60)
NON-UH HIE HGB: 13.6 G/DL (ref 12–16)
NON-UH HIE INSTR WBC: 7.8
NON-UH HIE K: 3.8 MMOL/L (ref 3.5–5.1)
NON-UH HIE LYMPH %: 26.9 %
NON-UH HIE LYMPH COUNT: 2.11 X1000 (ref 1.2–4.8)
NON-UH HIE MCH: 25.3 PG (ref 27–34)
NON-UH HIE MCHC: 33.1 G/DL (ref 32–37)
NON-UH HIE MCV: 76.4 FL (ref 80–100)
NON-UH HIE MONO %: 6.2 %
NON-UH HIE MONO COUNT: 0.48 X1000 (ref 0.1–1)
NON-UH HIE MPV: 8.9 FL (ref 7.4–10.4)
NON-UH HIE NA: 139 MMOL/L (ref 135–145)
NON-UH HIE NEUTROPHIL %: 60.4 %
NON-UH HIE NEUTROPHIL COUNT (ANC): 4.74 X1000 (ref 1.4–8.8)
NON-UH HIE NUCLEATED RBC: 0 /100WBC
NON-UH HIE PLATELET: 185 X10 (ref 150–450)
NON-UH HIE RBC: 5.38 X10 (ref 4.2–5.4)
NON-UH HIE RDW: 14.7 % (ref 11.5–14.5)
NON-UH HIE TOTAL CHOL/HDL CHOL RATIO: 4.5
NON-UH HIE TOTAL PROTEIN: 7.7 G/DL (ref 5.7–8.2)
NON-UH HIE TRIGLYCERIDES: 235 MG/DL (ref 30–150)
NON-UH HIE TSH: 1.93 UIU/ML (ref 0.55–4.78)
NON-UH HIE WBC: 7.8 X10 (ref 4.5–11)

## 2024-06-21 PROCEDURE — 3008F BODY MASS INDEX DOCD: CPT | Performed by: NURSE PRACTITIONER

## 2024-06-21 PROCEDURE — 99395 PREV VISIT EST AGE 18-39: CPT | Performed by: NURSE PRACTITIONER

## 2024-06-21 PROCEDURE — 99213 OFFICE O/P EST LOW 20 MIN: CPT | Performed by: NURSE PRACTITIONER

## 2024-06-21 RX ORDER — RIZATRIPTAN BENZOATE 10 MG/1
10 TABLET, ORALLY DISINTEGRATING ORAL ONCE AS NEEDED
Qty: 12 TABLET | Refills: 5 | Status: SHIPPED | OUTPATIENT
Start: 2024-06-21 | End: 2024-09-01

## 2024-06-21 ASSESSMENT — ENCOUNTER SYMPTOMS
VOMITING: 0
HEADACHES: 0
BLOOD IN STOOL: 0
BRUISES/BLEEDS EASILY: 0
UNEXPECTED WEIGHT CHANGE: 0
BACK PAIN: 0
TROUBLE SWALLOWING: 0
FATIGUE: 0
PALPITATIONS: 0
HALLUCINATIONS: 0
EYE PAIN: 0
FREQUENCY: 0
HEMATURIA: 0
ADENOPATHY: 0
CHILLS: 0
ABDOMINAL PAIN: 0
POLYDIPSIA: 0
EYE REDNESS: 0
APPETITE CHANGE: 0
DIZZINESS: 0
SHORTNESS OF BREATH: 0
DYSURIA: 0
SORE THROAT: 0
POLYPHAGIA: 0
EYE DISCHARGE: 0
WOUND: 0
COUGH: 0
FEVER: 0
NECK PAIN: 0
CONFUSION: 0

## 2024-06-21 ASSESSMENT — PATIENT HEALTH QUESTIONNAIRE - PHQ9
1. LITTLE INTEREST OR PLEASURE IN DOING THINGS: NOT AT ALL
2. FEELING DOWN, DEPRESSED OR HOPELESS: NOT AT ALL
SUM OF ALL RESPONSES TO PHQ9 QUESTIONS 1 AND 2: 0

## 2024-06-21 NOTE — PATIENT INSTRUCTIONS
See derm dr    Set up mammogram and ultrasound    Med refilled. The number of refills on the meds match when you need to return to the office for an appt. I recommend making your next appt today so you don't run out of your medication as it may take me up to 3 days to refill it.    Handouts given to pt:  physical handout        Labs:    You can use the lab in our building when fasting. The hrs are: Monday-Friday, 7 a.m. - 5 p.m., Saturday 8 a.m. - 12 noon.   No appt needed, BUT YOU DO NEED THE PAPER ORDER.    Fasting is no food, drink, gum or mints other than water for 12 hrs.   Results will be back in 2-3 business days for most labs. It is always recommended for any orders (labs, xrays, ultrasounds,MRI, ct scan, procedures etc) to check with your insurance provider for expected costs or expenses to you.     Screenings:    To set up mammogram, call [phone]    You will get your results via phone from my medical assistant if you do not have MyChart.  OR  You will get your results via [employer]    If a result is urgent, I will call to speak to you.    Vaccines:    Up to date    General recommendations:  Exercise-cardio 4-5d/wk 30min each day  Diet-Breakfast-toast (my favorite Piedad Olivarez Delighful Multigrain or Sathish's Killer Bread Good Seed thin-sliced)/bagel/English muffin-whole wheat flour as a 1st ingredient or cereal/oatmeal/granola bar-fiber 4g or more or protein like eggs or peanut butter; optional veggies  Lunch-protein, 1/2c carb or 2 slices bread, veg 1c  Dinner-protein, fist sized carb, veg 1c  Fruit 2 a day  Dairy 2 a day-milk, soy milk, almond milk, cheese, yogurt, cottage cheese  Snacks-Protein-hard boiled egg, nuts (walnuts/almonds/pecans/pistachios 1/4c), hummus, beef/deer jerky or meat sticks; vegetable, fruit, dairy-milk(1%, skim, almond, soy)/cheese (not a lot of cheddar)/yogurt (Greek is best-my favorite Dannon Fruit on the Bottom Greek)/cottage cheese 2%; triscuits/ popcorn/wheat thins have a lot of  fiber; follow serving size on bag/box/container  increase water  Limit alcohol to 1 drink per day for women and 2 drinks per day for men (1 drink=12oz beer or 5oz wine or 1 1/2oz liquor)  Calcium: 500mg 1 twice a day if age 50 and younger and 600mg 1 twice a day if over age 50 (calcium citrate can be taken without food)  Vitamin D: 800-5000 IU/day  Limit salt to <2300mg a day if age 50 and under and <1500mg a day if over age 50/have high bp or diabetes or kidney disease  Recommend folate for childbearing age women 0.4mg per day (can be found in a multivitamin)  Recommend 18mg/dL of iron a day if age 50 and under and 8mg/dL a day if over age 50; take on an empty stomach at bedtime  Use sunscreen   Wear seatbelt  Recommend safe sex practices: using condoms everytime you have sex, discuss with a new partner about their past partners/history of STDs/drug use, avoid drinking alcohol or using drugs as this increases the chance that you will participate in high-risk sex, for oral sex help protect your mouth by having your partner use a condom (male or female), women should not douche after sex, be aware of your partner's body and your body-look for signs of a sore, blister, rash, or discharge, and have regular exams and periodic tests for STDs.  No distracted driving  No driving when under influence of substances  Wear a seatbelt  Eye dr every 1-2yrs  Dentist every 6-12 mon  Tetanus shot every 10yrs  Recommend flu vaccine in the fall  Appt in  1 year for physical      I will communicate with you via [email or web address]) regarding messages and results. If you need help with this, you can call the support line at [phone].    IT WAS A PLEASURE TO SEE YOU TODAY. THANK YOU FOR CHOOSING US FOR YOUR HEALTHCARE NEEDS.

## 2024-06-21 NOTE — PROGRESS NOTES
Subjective   Patient ID: Lydia Flores is a 38 y.o. female who presents for Annual Exam.      Is pt fasting? Yes   Does pt see any providers other than care team below:   Pierre, shah, john, abramovich, beny, choudhury  Does pt see a therapist or psychiatrist? No      Any forms to fill out? No   Last use of maxalt?  1 month   Does pt have an albuterol inh at home? No   When did rt breast pain start? Feb 2024 was intermittent at that time now is constant  When did pt notice spot on rt leg? End of last summer   Has it changed since she first noticed it? Yes bigger and darker   Any other questions or concerns that pt wants to discuss today? No       No care team member to display    HPI  Last labs-11/2023 cbc nl  7/2023   Sugar (aka glucose), kidney function, liver function and electrolytes in the CMP (comprehensive metabolic panel) were normal.  CBC (complete blood count) was normal which looks at infection and anemia markers.  TSH (thyroid test) was normal.  4/2023 vitamin d nl, b12 nl  2018 trigs 188, hdl 38, ldl 102, A1c 5.3    Due for labs- all    Cholesterol   Date Value Ref Range Status   10/30/2018 178 0 - 199 mg/dL Final     Comment:     .      AGE      DESIRABLE   BORDERLINE HIGH   HIGH     0-19 Y     0 - 169       170 - 199     >/= 200    20-24 Y     0 - 189       190 - 224     >/= 225         >24 Y     0 - 199       200 - 239     >/= 240   **All ranges are based on fasting samples. Specific   therapeutic targets will vary based on patient-specific   cardiac risk.  .   Pediatric guidelines reference:Pediatrics 2011, 128(S5).   Adult guidelines reference: NCEP ATPIII Guidelines,     EVELYN 2001, 258:2486-97  .   Venipuncture immediately after or during the    administration of Metamizole may lead to falsely   low results. Testing should be performed immediately   prior to Metamizole dosing.       Triglycerides   Date Value Ref Range Status   10/30/2018 188 (H) 0 - 149 mg/dL Final     Comment:      ".      AGE      DESIRABLE   BORDERLINE HIGH   HIGH     VERY HIGH   0 D-90 D    19 - 174         ----         ----        ----  91 D- 9 Y     0 -  74        75 -  99     >/= 100      ----    10-19 Y     0 -  89        90 - 129     >/= 130      ----    20-24 Y     0 - 114       115 - 149     >/= 150      ----         >24 Y     0 - 149       150 - 199    200- 499    >/= 500  .   Venipuncture immediately after or during the    administration of Metamizole may lead to falsely   low results. Testing should be performed immediately   prior to Metamizole dosing.       HDL   Date Value Ref Range Status   10/30/2018 38.1 (A) mg/dL Final     Comment:     .      AGE      VERY LOW   LOW     NORMAL    HIGH       0-19 Y       < 35   < 40     40-45     ----    20-24 Y       ----   < 40       >45     ----      >24 Y       ----   < 40     40-60      >60  .       No results found for: \"LDL\"  TSH   Date Value Ref Range Status   10/30/2018 1.05 0.44 - 3.98 mIU/L Final     Comment:      TSH testing is performed using different testing    methodology at [facility] than at other    [facility]. Direct result comparisons should    only be made within the same method.  .   Patients receiving more than 5 mg/day of biotin may have interference   in test results.  A sample should be taken no sooner than eight hours   after  previous dose. Contact [phone] for additional information.       No results found for: \"A1C\"  No components found for: \"POCA1C\"  No results found for: \"ALBUR\"  No components found for: \"POCALBUR\"      Other concerns: rt br pain all over x 4mon. Was intermittent, not it is constant; outer side hurts the most when lay on it or press on it; rt breast feels heavier.   Bought diff bras and tried diff sleeping positions  No lump noted  No nipple discharge  No breast rash or skin change  No redness or swelling  No fall or injury  No fever or chills or night sweats    Spot on rt leg-outer thigh x 10mon  Has " gotten bigger and darker over that time period  No open area or discharge  No pain, redness, swelling, bruising.    12/2023 lap surgery-leftover bit of fallopian tube from hyster  Hyster 2019    bps at home- none    ER/urgicare visits in the last year- 5/3/24 rt upper back pain radiating to chest-given toradol and meloxicam  Cmp nl, cbc nl, ddimer nl, troponin nl  UA nl  Rib xray nl  Hospitalizations in the last year- none      last Pap- 10/17/23; hyster-no cervix-only ovaries in  H/o abn pap-abn and precanc cells on cervix and then hyster    FH ovarian, cervical, uterine ca-mom-adopted-none dads side      FH br ca-mom adopted-none-none dad's side      FH colon ca-mom-adopted-none dad's side      Exercise- walk [employer]  Diet-1-2 meals   Body mass index is 31.55 kg/m².        last dental appt-1 1/2yrs ago    BMs-regular  Sleep-able to fall asleep and stay asleep; no snoring or apnea  no cp, swelling, sob, abd pain, n/v/d/c, blood in stool or black stools  STI testing including hiv (age 15-65) and hep c screening (18-79)-declines        Immunization History   Administered Date(s) Administered    DTP 1987, 02/11/1988, 04/18/1988    DTaP, Unspecified 06/26/1991, 05/26/1992    Flu vaccine, quadrivalent, no egg protein, age 6 month or greater (FLUCELVAX) 11/15/2018    HiB, unspecified 06/26/1989    Influenza, Unspecified 11/17/2010    MMR vaccine, subcutaneous (MMR II) 01/19/1989, 08/22/2000, 04/07/2017    Novel influenza-H1N1-09, preservative-free 12/05/2009    Pfizer Purple Cap SARS-CoV-2 11/08/2021, 11/29/2021    Pneumococcal polysaccharide vaccine, 23-valent, age 2 years and older (PNEUMOVAX 23) 08/04/2010    Polio, Unspecified 1987, 04/18/1988, 06/26/1991, 05/26/1993    Tdap vaccine, age 7 year and older (BOOSTRIX, ADACEL) 01/22/2019         fractures in lifetime-rt fingers  Anyone with osteoporosis in the family-mom-adopted; none dads side    FH heart attack, heart surgery-mom-adopted;none dads  side  FH stroke-mom-adopted; none dads side    The ASCVD Risk score (Dominic DK, et al., 2019) failed to calculate for the following reasons:    The 2019 ASCVD risk score is only valid for ages 40 to 79  Coronary Artery Calcium score:  This test is recommended for men 45 or older and women 55 or older without a history of heart disease and have 1 risk factor (high LDL cholesterol, low HDL cholesterol, high blood pressure, smoker (current or past), type 2 diabetes, IBD, lupus, RA, ankylosing spondylitis, psoriasis or family history of  heart disease <55yrs in dad, brother or child or <65yrs in mom, sister, or child.)       Review of Systems   Constitutional:  Negative for appetite change, chills, fatigue, fever and unexpected weight change.   HENT:  Negative for congestion, ear pain, sore throat and trouble swallowing.    Eyes:  Negative for pain, discharge and redness.   Respiratory:  Negative for cough and shortness of breath.    Cardiovascular:  Negative for chest pain and palpitations.   Gastrointestinal:  Negative for abdominal pain, blood in stool and vomiting.   Endocrine: Negative for polydipsia, polyphagia and polyuria.   Genitourinary:  Negative for dysuria, frequency, hematuria and urgency.        Rt breast pain   Musculoskeletal:  Negative for back pain and neck pain.   Skin:  Negative for rash and wound.        Mole rt thigh   Allergic/Immunologic: Negative for immunocompromised state.   Neurological:  Negative for dizziness, syncope and headaches.   Hematological:  Negative for adenopathy. Does not bruise/bleed easily.   Psychiatric/Behavioral:  Negative for confusion and hallucinations.        Objective   Visit Vitals  /89   Pulse 110   Temp 36.9 °C (98.4 °F)      BP Readings from Last 3 Encounters:   06/21/24 131/89   10/18/23 (!) 138/91   07/18/23 130/84     Wt Readings from Last 3 Encounters:   06/21/24 86 kg (189 lb 9.6 oz)   10/18/23 87.1 kg (192 lb 0.1 oz)   07/18/23 86.2 kg (190 lb)  "          Physical Exam  Constitutional:       General: She is not in acute distress.     Appearance: Normal appearance. She is not ill-appearing.   HENT:      Head: Normocephalic.      Right Ear: Tympanic membrane, ear canal and external ear normal.      Left Ear: Tympanic membrane, ear canal and external ear normal.      Nose: Nose normal.      Mouth/Throat:      Mouth: Mucous membranes are moist.      Pharynx: Oropharynx is clear.   Eyes:      Extraocular Movements: Extraocular movements intact.      Conjunctiva/sclera: Conjunctivae normal.      Pupils: Pupils are equal, round, and reactive to light.   Cardiovascular:      Rate and Rhythm: Normal rate and regular rhythm.      Heart sounds: Normal heart sounds. No murmur heard.  Pulmonary:      Effort: Pulmonary effort is normal. No respiratory distress.      Breath sounds: Normal breath sounds. No wheezing, rhonchi or rales.   Chest:   Breasts:     Right: Mass (4mm lump noted rt breast at 7oclock about 1\" from nipple) and tenderness (outer rt breast) present. No swelling, bleeding, inverted nipple, nipple discharge or skin change.      Left: Normal. No swelling, bleeding, inverted nipple, mass, nipple discharge, skin change or tenderness.   Abdominal:      General: Bowel sounds are normal.      Palpations: Abdomen is soft. There is no mass.      Tenderness: There is no abdominal tenderness.   Musculoskeletal:         General: No swelling or tenderness. Normal range of motion.      Cervical back: Normal range of motion and neck supple.      Right lower leg: No edema.      Left lower leg: No edema.   Skin:     General: Skin is warm.      Findings: No rash.      Comments: 3mm mole noted rt thigh. Not symmetrical. Different colors in it.   Neurological:      General: No focal deficit present.      Mental Status: She is alert and oriented to person, place, and time.      Cranial Nerves: No cranial nerve deficit.      Motor: No weakness.   Psychiatric:         Mood and " Affect: Mood normal.         Behavior: Behavior normal.         Assessment/Plan   Diagnoses and all orders for this visit:  Routine general medical examination at a health care facility  -     Comprehensive Metabolic Panel; Future  -     CBC and Auto Differential; Future  -     Lipid Panel; Future  -     TSH with reflex to Free T4 if abnormal; Future  Increased frequency of headaches  -     rizatriptan MLT (Maxalt-MLT) 10 mg disintegrating tablet; Take 1 tablet (10 mg) by mouth 1 time if needed for migraine. May repeat in 2 hours if unresolved. Do not exceed 30 mg in 24 hours.  BMI 31.0-31.9,adult  Mass of lower outer quadrant of right breast  -     BI mammo bilateral diagnostic; Future  -     BI US breast complete right; Future  Breast pain, right  -     BI mammo bilateral diagnostic; Future  -     BI US breast complete right; Future  Suspicious nevus  -     Referral to Dermatology

## 2024-07-11 ENCOUNTER — APPOINTMENT (OUTPATIENT)
Dept: PRIMARY CARE | Facility: CLINIC | Age: 39
End: 2024-07-11
Payer: COMMERCIAL

## 2024-10-15 ASSESSMENT — ENCOUNTER SYMPTOMS
CONSTITUTIONAL NEGATIVE: 1
WHEEZING: 0
SHORTNESS OF BREATH: 0

## 2024-10-15 NOTE — PROGRESS NOTES
"Subjective   Patient ID: Lydia Flores is a 39 y.o. female who presents for Headache.  Last physical:  6/21/24; has next cpe scheduled  Diag ana and ultrasound 7/2024 -Mammogram and ultrasound normal.  Breast ducts noted in the area of concern.  No mass noted.  Recommend screening mammogram at age 40.  See ob gyn if you are having continued pain.  Last labs-6/2024   Ldl slightly high at 108. Goal <100. Decr fats and incr fiber.  Trigs high at 235. Goal <150. Decr carbs and sugars.  Hdl low at 44. Goal >50 for women. Incr exercise.  CBC (complete blood count) was normal which looks at infection and anemia markers.  TSH (thyroid test) was normal.  Sugar (aka glucose), kidney function, liver function and electrolytes in the CMP (comprehensive metabolic panel) were normal.  4/2023 vit d low    Does pt want flu shot? Yes   Is pt still smoking or did she quit? Still smoking half a pack a day   Does she have an albuterol inh at home? No   Did pt see derm for moles?  Yes   If yes name- whoever you had referred her to she doesn't remember name -yetzer  What concerns does she have about her migraines? The progression in how worse they are getting. ER on Friday for migraine   Last rizatriptan use? Friday   How many migraines per wk or mon? 3 times a week   Is the nausea she mentioned when making the appt only with the migraines?yes   Reason for visit \"generally not feeling well\"-what other sx besides migraines and nausea is she having? All regards to migraine.   When did pt notice lump? Summer Ena   Where is the lump noted? Thigh    Any other questions or concerns that pt wants to discuss today? No       HPI  What concerns does she have about her migraines? More frequent and severe-legs gave out;  ER on Friday for migraine and vomiting/nausea -given zofran and got IV meds; ct head neg; mg nl, cmp nl. Cbc nl, covid neg, flu neg  Last rizatriptan use? Friday -not helping migraines now  How many migraines per wk or mon? " 3-4 times a week ; missed work 3 times in the last 3wks. Maxalt not helping  Is the nausea she mentioned when making the appt only with the migraines?yes   No HA right now  A lot stress- dx with cancer, dtr severe mental health issues    Light sensitivity-yes  Sound sensitivity-yes  Dizziness-none  Vision change-yes monique right eye    Has neuro dr appt 2/2025 with   Fmla form for intermittent    Lump rt outer thigh x4mon  Has rt knee pain as usual and radiates pain up and down  No pain or itching  No drainage  No redness rash or swelling  No fever or chills  No fall or injury  Selftxt:none      No care team member to display     Review of Systems   Constitutional: Negative.  Negative for chills and fever.   Eyes:  Positive for visual disturbance.   Respiratory:  Negative for shortness of breath and wheezing.    Cardiovascular:  Negative for chest pain.   Gastrointestinal:  Positive for nausea and vomiting.   Skin:         Lump outer rt thigh   Neurological:  Positive for headaches. Negative for dizziness.       Objective   Visit Vitals  BP (!) 153/104   Pulse (!) 116   Temp 36.3 °C (97.3 °F)      BP Readings from Last 3 Encounters:   10/16/24 (!) 153/104   06/21/24 131/89   10/18/23 (!) 138/91     Wt Readings from Last 3 Encounters:   10/16/24 89.6 kg (197 lb 9.6 oz)   06/21/24 86 kg (189 lb 9.6 oz)   10/18/23 87.1 kg (192 lb 0.1 oz)       Physical Exam  Constitutional:       General: She is not in acute distress.     Appearance: Normal appearance.   HENT:      Head: Normocephalic.      Right Ear: Tympanic membrane, ear canal and external ear normal.      Left Ear: Tympanic membrane, ear canal and external ear normal.      Nose: Nose normal.      Mouth/Throat:      Mouth: Mucous membranes are moist.      Pharynx: No oropharyngeal exudate or posterior oropharyngeal erythema.   Cardiovascular:      Rate and Rhythm: Normal rate and regular rhythm.      Heart sounds: Normal heart sounds.   Pulmonary:      Effort:  Pulmonary effort is normal.      Breath sounds: Normal breath sounds. No wheezing, rhonchi or rales.   Lymphadenopathy:      Cervical: No cervical adenopathy.   Skin:     Comments: 1cm lump outer right thigh. No redness rash or swelling. No open area or discharge. No pain to palpation   Neurological:      Mental Status: She is alert.      Comments: Neg romberg  Able to do alt mvmts  Able to do finger to nose  Able to do finger to nose to finger         Assessment/Plan   Diagnoses and all orders for this visit:  Gastroesophageal reflux disease, unspecified whether esophagitis present  -     pantoprazole (ProtoNix) 40 mg EC tablet; Take 1 tablet (40 mg) by mouth once daily. Do not crush, chew, or split.  Migraine without aura and without status migrainosus, not intractable  -     propranolol LA (Inderal LA) 60 mg 24 hr capsule; Take 1 capsule (60 mg) by mouth once daily. Do not crush, chew, or split.  -     eletriptan (Relpax) 20 mg tablet; Take 1 tablet (20 mg) by mouth 1 time if needed for migraine. May repeat once after 2 hours.  Other orders  -     Flu vaccine, trivalent, preservative free, age 6 months and greater (Fluarix/Fluzone/Flulaval)

## 2024-10-16 ENCOUNTER — APPOINTMENT (OUTPATIENT)
Dept: PRIMARY CARE | Facility: CLINIC | Age: 39
End: 2024-10-16
Payer: COMMERCIAL

## 2024-10-16 VITALS
HEIGHT: 65 IN | WEIGHT: 197.6 LBS | TEMPERATURE: 97.3 F | DIASTOLIC BLOOD PRESSURE: 87 MMHG | SYSTOLIC BLOOD PRESSURE: 135 MMHG | HEART RATE: 116 BPM | BODY MASS INDEX: 32.92 KG/M2 | OXYGEN SATURATION: 98 %

## 2024-10-16 DIAGNOSIS — G43.009 MIGRAINE WITHOUT AURA AND WITHOUT STATUS MIGRAINOSUS, NOT INTRACTABLE: Primary | ICD-10-CM

## 2024-10-16 DIAGNOSIS — G43.009 MIGRAINE WITHOUT AURA AND WITHOUT STATUS MIGRAINOSUS, NOT INTRACTABLE: ICD-10-CM

## 2024-10-16 DIAGNOSIS — K21.9 GASTROESOPHAGEAL REFLUX DISEASE, UNSPECIFIED WHETHER ESOPHAGITIS PRESENT: ICD-10-CM

## 2024-10-16 PROCEDURE — 3008F BODY MASS INDEX DOCD: CPT | Performed by: NURSE PRACTITIONER

## 2024-10-16 PROCEDURE — 90471 IMMUNIZATION ADMIN: CPT | Performed by: NURSE PRACTITIONER

## 2024-10-16 PROCEDURE — 90656 IIV3 VACC NO PRSV 0.5 ML IM: CPT | Performed by: NURSE PRACTITIONER

## 2024-10-16 PROCEDURE — 99214 OFFICE O/P EST MOD 30 MIN: CPT | Performed by: NURSE PRACTITIONER

## 2024-10-16 RX ORDER — PANTOPRAZOLE SODIUM 40 MG/1
40 TABLET, DELAYED RELEASE ORAL DAILY
Qty: 90 TABLET | Refills: 0 | Status: SHIPPED | OUTPATIENT
Start: 2024-10-16 | End: 2024-12-15

## 2024-10-16 RX ORDER — PROPRANOLOL HYDROCHLORIDE 60 MG/1
60 CAPSULE, EXTENDED RELEASE ORAL DAILY
Qty: 30 CAPSULE | Refills: 11 | Status: SHIPPED | OUTPATIENT
Start: 2024-10-16 | End: 2024-10-16

## 2024-10-16 RX ORDER — PROPRANOLOL HYDROCHLORIDE 60 MG/1
60 CAPSULE, EXTENDED RELEASE ORAL DAILY
Qty: 30 CAPSULE | Refills: 11 | Status: SHIPPED | OUTPATIENT
Start: 2024-10-16

## 2024-10-16 RX ORDER — ELETRIPTAN HYDROBROMIDE 20 MG/1
20 TABLET, FILM COATED ORAL ONCE AS NEEDED
Qty: 12 TABLET | Refills: 11 | Status: SHIPPED | OUTPATIENT
Start: 2024-10-16 | End: 2025-10-16

## 2024-10-16 ASSESSMENT — ENCOUNTER SYMPTOMS
NAUSEA: 1
VOMITING: 1
CHILLS: 0
DIZZINESS: 0
HEADACHES: 1
FEVER: 0

## 2024-10-16 ASSESSMENT — PATIENT HEALTH QUESTIONNAIRE - PHQ9
2. FEELING DOWN, DEPRESSED OR HOPELESS: NOT AT ALL
1. LITTLE INTEREST OR PLEASURE IN DOING THINGS: NOT AT ALL
SUM OF ALL RESPONSES TO PHQ9 QUESTIONS 1 AND 2: 0

## 2024-10-16 NOTE — PATIENT INSTRUCTIONS
Stop smoking    Flu shot today    Refill pantoprazole    Keep neuro  appt  Propranolol to prevent migraines  Eletriptan to take when getting a migraine    Intermittent fmla form filled out    Let me know if lump rt thigh gets bigger or painful    Keep June appt    I will communicate with you via [employer] regarding messages and results. If you need help with this, you can call the support line at [phone].    IT WAS A PLEASURE TO SEE YOU TODAY. THANK YOU FOR CHOOSING US FOR YOUR HEALTHCARE NEEDS.

## 2024-10-30 ENCOUNTER — PATIENT MESSAGE (OUTPATIENT)
Dept: PRIMARY CARE | Facility: CLINIC | Age: 39
End: 2024-10-30

## 2024-10-30 ENCOUNTER — APPOINTMENT (OUTPATIENT)
Dept: PRIMARY CARE | Facility: CLINIC | Age: 39
End: 2024-10-30
Payer: COMMERCIAL

## 2024-10-30 DIAGNOSIS — G43.009 MIGRAINE WITHOUT AURA AND WITHOUT STATUS MIGRAINOSUS, NOT INTRACTABLE: Primary | ICD-10-CM

## 2024-10-30 RX ORDER — PROPRANOLOL HYDROCHLORIDE 80 MG/1
80 CAPSULE, EXTENDED RELEASE ORAL DAILY
Qty: 30 CAPSULE | Refills: 1 | Status: SHIPPED | OUTPATIENT
Start: 2024-10-30 | End: 2025-10-30

## 2024-11-08 ENCOUNTER — PATIENT MESSAGE (OUTPATIENT)
Dept: PRIMARY CARE | Facility: CLINIC | Age: 39
End: 2024-11-08
Payer: COMMERCIAL

## 2024-11-08 DIAGNOSIS — G43.009 MIGRAINE WITHOUT AURA AND WITHOUT STATUS MIGRAINOSUS, NOT INTRACTABLE: ICD-10-CM

## 2024-11-11 ENCOUNTER — TELEPHONE (OUTPATIENT)
Dept: PRIMARY CARE | Facility: CLINIC | Age: 39
End: 2024-11-11

## 2024-11-11 NOTE — TELEPHONE ENCOUNTER
Please call pt.  Pt has not viewed the [employer] message below:         Good morning!  Is the incr in propranolol helping decr the frequency of headaches?

## 2024-11-12 RX ORDER — PROPRANOLOL HYDROCHLORIDE 80 MG/1
80 CAPSULE, EXTENDED RELEASE ORAL DAILY
Qty: 90 CAPSULE | Refills: 1 | Status: SHIPPED | OUTPATIENT
Start: 2024-11-12 | End: 2025-11-12

## 2025-02-04 ENCOUNTER — APPOINTMENT (OUTPATIENT)
Dept: NEUROLOGY | Facility: CLINIC | Age: 40
End: 2025-02-04
Payer: COMMERCIAL

## 2025-02-04 VITALS
HEART RATE: 93 BPM | TEMPERATURE: 97.8 F | SYSTOLIC BLOOD PRESSURE: 122 MMHG | RESPIRATION RATE: 16 BRPM | DIASTOLIC BLOOD PRESSURE: 86 MMHG

## 2025-02-04 DIAGNOSIS — G43.009 MIGRAINE WITHOUT AURA AND WITHOUT STATUS MIGRAINOSUS, NOT INTRACTABLE: Primary | ICD-10-CM

## 2025-02-04 PROCEDURE — 99204 OFFICE O/P NEW MOD 45 MIN: CPT | Performed by: NURSE PRACTITIONER

## 2025-02-04 RX ORDER — PREDNISONE 10 MG/1
TABLET ORAL
Qty: 42 TABLET | Refills: 0 | Status: SHIPPED | OUTPATIENT
Start: 2025-02-04 | End: 2025-02-16

## 2025-02-04 RX ORDER — TOPIRAMATE 100 MG/1
1 TABLET, FILM COATED ORAL
COMMUNITY
Start: 2024-11-01

## 2025-02-04 RX ORDER — PROPRANOLOL HYDROCHLORIDE 120 MG/1
120 CAPSULE, EXTENDED RELEASE ORAL DAILY
Qty: 30 CAPSULE | Refills: 1 | Status: SHIPPED | OUTPATIENT
Start: 2025-02-04

## 2025-02-04 ASSESSMENT — PAIN SCALES - GENERAL: PAINLEVEL_OUTOF10: 0-NO PAIN

## 2025-02-04 NOTE — PROGRESS NOTES
"Chief Complaint   Patient presents with    New Patient Visit    Migraine       Subjective   HPI  Lydia Flores is a 39 y.o. year old female who presents with chief complaint Migraine without aura.  Patient presents today for initial evaluation headache. States headache has gotten worse since October 2024. Recent emergency visit on 10/11/24  Pt has to repeat dosing of eletriptan with every headache, propranolol was recently increased to 80mg with some relief at first. During a headache patient has to lay in a dark room for multiple hours, had missed work 5 times since October due to severe headache.   Lydia is experiencing  4 days a week and 20  HA days per month, 20 of the HAs meet migraine criteria.   Triggers include stress.weather but no consistent pattern.   Aura   The headaches are usually sharp and stabbing states head feels like \"its going explode and are located behing both eyes and frontal lobe without radiation generally symmetric.   The patient rates the most severe headaches a 10 in intensity.   Generally, headaches last about 1 hour if eletriptan has been effective to 12 hours in duration.   Associated nausea, photophobia, phonophobia, and vomiting.   Headaches are worsened with exertion.   The current rescue medications work within  1 hours  if effective and decreased the headache by 40%. The patient  repeats treatment with rescue medication. Yes   Work attendance or other daily activities affected: yes       Current/ past HA treatments:  Preventive:  Topiramate  Propranolol    Rescue:  Acetaminophen  Ibuprofen  Rizatriptan      Current Outpatient Medications:     eletriptan (Relpax) 20 mg tablet, Take 1 tablet (20 mg) by mouth 1 time if needed for migraine. May repeat once after 2 hours., Disp: 12 tablet, Rfl: 11    famotidine (Pepcid) 40 mg tablet, once daily., Disp: , Rfl:     propranolol LA (Inderal LA) 80 mg 24 hr capsule, Take 1 capsule (80 mg) by mouth once daily. Do not crush, chew, or " split., Disp: 90 capsule, Rfl: 1    topiramate (Topamax) 100 mg tablet, Take 1 tablet (100 mg) by mouth early in the morning.., Disp: , Rfl:     pantoprazole (ProtoNix) 40 mg EC tablet, Take 1 tablet (40 mg) by mouth once daily. Do not crush, chew, or split., Disp: 90 tablet, Rfl: 0    rizatriptan MLT (Maxalt-MLT) 10 mg disintegrating tablet, Take 1 tablet (10 mg) by mouth 1 time if needed for migraine. May repeat in 2 hours if unresolved. Do not exceed 30 mg in 24 hours., Disp: 12 tablet, Rfl: 5    Allergies   Allergen Reactions    Doxycycline Hives and Itching     rash       Past Medical History:   Diagnosis Date    Acute candidiasis of vulva and vagina 10/20/2014    Yeast vaginitis    ADHD (attention deficit hyperactivity disorder)     Anxiety     Depression     Encounter for screening for malignant neoplasm of cervix 10/30/2013    Screening for malignant neoplasm of cervix    Moderate cervical dysplasia 2014    KANDY II (cervical intraepithelial neoplasia II)    Personal history of other diseases of the respiratory system 10/10/2014    History of acute bronchitis    Seasonal allergies      Past Surgical History:   Procedure Laterality Date     SECTION, CLASSIC  2015     Section     SECTION, LOW TRANSVERSE  ,     ESOPHAGEAL DILATION      x8 in [id number]; dr swan    EXPLORATORY LAPAROTOMY  2023    abd-piece of fallopian tube noted    HYSTERECTOMY  2019    KNEE SURGERY  2015    Knee Surgery    OOPHORECTOMY      SALPINGECTOMY      TUBAL LIGATION  2015    Tubal Ligation     Family History   Problem Relation Name Age of Onset    Rheum arthritis Mother Hannah     Other (liver failure) Mother Hannah     Alcohol abuse Mother Hannah     Endometriosis Mother Hannah     Bipolar disorder Mother Hannah     Depression Mother Hannah     Drug abuse Mother Hannah     Mental illness Mother Hannah     Diabetes Paternal Grandfather Marcus     Cancer Paternal Grandfather Marcus      Cancer Maternal Grandfather Marcus      Social History     Tobacco Use    Smoking status: Every Day     Current packs/day: 0.50     Average packs/day: 0.5 packs/day for 10.0 years (5.0 ttl pk-yrs)     Types: Cigarettes    Smokeless tobacco: Never   Substance Use Topics    Alcohol use: Never     Social History     Substance and Sexual Activity   Drug Use Never        ROS  As noted in HPI, otherwise all other systems have been reviewed are negative for complaint.     Objective   General Appearance: Lydia is well-developed, well-nourished, 39 y.o. year old female, in no acute distress. Makes good eye contact, is alert, interactive, and cooperative. Demonstrates recent & remote memory recall. Subjective information consistent with objective assessment.     Vitals:    02/04/25 0827   BP: 122/86   Pulse: 93   Resp: 16   Temp: 36.6 °C (97.8 °F)   TempSrc: Temporal   PainSc: 0-No pain       Lab Results   Component Value Date    HGBA1C 5.3 10/30/2018    CHOL 178 10/30/2018    HDL 38.1 (A) 10/30/2018    TRIG 188 (H) 10/30/2018    TSH 1.05 10/30/2018        RECORDS REVIEW:      Assessment & Plan  Migraine without aura and without status migrainosus, not intractable    Orders:    propranolol LA (Inderal LA) 120 mg 24 hr capsule; Take 1 capsule (120 mg) by mouth once daily. Do not crush, chew, or split.    predniSONE (Deltasone) 10 mg tablet; Take 6 tablets (60 mg) by mouth once daily for 2 days, THEN 5 tablets (50 mg) once daily for 2 days, THEN 4 tablets (40 mg) once daily for 2 days, THEN 3 tablets (30 mg) once daily for 2 days, THEN 2 tablets (20 mg) once daily for 2 days, THEN 1 tablet (10 mg) once daily for 2 days.         ASSESSMENT/PLAN:  Increase propranolol to 120mg daily for maintenance dose,   Head dose tapering prednisone for 12 days starting at 60mg to break up the headache cycle  Continue Topiramate 100 mg and eletriptan prn.   Follow up in 4-6 weeks         NANNETTE FUNES student

## 2025-02-04 NOTE — ASSESSMENT & PLAN NOTE
Orders:    propranolol LA (Inderal LA) 120 mg 24 hr capsule; Take 1 capsule (120 mg) by mouth once daily. Do not crush, chew, or split.    predniSONE (Deltasone) 10 mg tablet; Take 6 tablets (60 mg) by mouth once daily for 2 days, THEN 5 tablets (50 mg) once daily for 2 days, THEN 4 tablets (40 mg) once daily for 2 days, THEN 3 tablets (30 mg) once daily for 2 days, THEN 2 tablets (20 mg) once daily for 2 days, THEN 1 tablet (10 mg) once daily for 2 days.

## 2025-03-19 ASSESSMENT — ENCOUNTER SYMPTOMS
FACIAL SWELLING: 0
EYE DISCHARGE: 0
SINUS PRESSURE: 0
CHILLS: 0
ABDOMINAL PAIN: 0
EYE REDNESS: 0
WHEEZING: 0
VOMITING: 0
MYALGIAS: 0
FEVER: 0
ARTHRALGIAS: 0
RHINORRHEA: 0
SORE THROAT: 0
PALPITATIONS: 0
DIARRHEA: 0
FATIGUE: 0
CHEST TIGHTNESS: 0
HEADACHES: 0

## 2025-03-19 NOTE — PROGRESS NOTES
Subjective   Patient ID: Lydia Flores is a 39 y.o. female who presents for Cough.  Last physical: 6/21/24; has June appt scheduled  Last mammo-7/8/24-diag and us-  Mammogram and ultrasound normal.  Breast ducts noted in the area of concern.  No mass noted.  Recommend screening mammogram at age 40.  See ob gyn if you are having continued pain.  ER 1/2025 flu sx, 10/2024 HA  Last labs-10/2024 cbc nl, cmp nl  6/2024 Ldl slightly high at 108. Goal <100. Decr fats and incr fiber.  Trigs high at 235. Goal <150. Decr carbs and sugars.  Hdl low at 44. Goal >50 for women. Incr exercise.  CBC (complete blood count) was normal which looks at infection and anemia markers.  TSH (thyroid test) was normal.  Sugar (aka glucose), kidney function, liver function and electrolytes in the CMP (comprehensive metabolic panel) were normal.    Does pt want to discuss quitting smoking? Yes - havent smoked in 4 days   Did pt see derm ? Yes- brandi   current sx- coough, sob, back pain by ribs  when did sx start- end of feb about the 25th   did pt take a covid-19 test? Yes, urgent care negative   if yes when?   Does pt have an albuterol inh at home? Yes   If yes last use- a couple hours ago      had ca in his 30s    Cough  This is a new problem. The current episode started 1 to 4 weeks ago. The problem has been gradually worsening. The problem occurs every few hours. The cough is Productive of blood-tinged sputum. Associated symptoms include hemoptysis, nasal congestion, postnasal drip, shortness of breath and sweats. Pertinent negatives include no chest pain, chills, ear congestion, ear pain, eye redness, fever, headaches, heartburn, myalgias, rash, rhinorrhea, sore throat, weight loss or wheezing. The symptoms are aggravated by exercise and lying down. Risk factors for lung disease include smoking/tobacco exposure.     Was smoking 1/2 ppd but not smoking the last 4d  Used chantix in the past-mental side effects  Used  wellbutrin for depression and smoked with it    3/12/24 went to [facility] urgent care for 2d cough but had cold since end of feb  Also had fatigue, sob, muscle aches, and pain with expiration  Given proventil and tessalon  Cxr neg except for tiny nodules RML  Neg covid test     Current sx-ongoing cough with back pain when breathing and SOB with exertion since end of February   Nasal drip at night    no  wheezing, tight upper chest, fever, chills, muscle/jt pain, HA, ST, new loss of taste or smell, diarrhea, vomiting, nausea, abdominal pain, fatigue, weakness, red eye, rash, bruising, cyanosis, ear pain, ear pressure, runny nose, stuffy nose,  pain with deep breath, leg or foot swelling, calf pain  loss of appetite-none  fluids-yes  has urinated at least 3 times in 24hrs  Seasonal allergies-none  Selftxt-robitussin, mucinex, cold meds, allergy meds, humidifier  Last use albuterol inhaler -2hrs ago    Risk factors:  Chronic disease/comorbidities: asthma, smoker  not a healthcare worker  Age: not 65yrs of age and older      No care team member to display     Review of Systems   Constitutional:  Negative for chills, fatigue, fever and weight loss.   HENT:  Positive for postnasal drip. Negative for congestion, ear discharge, ear pain, facial swelling, rhinorrhea, sinus pressure and sore throat.    Eyes:  Negative for discharge and redness.   Respiratory:  Positive for cough, hemoptysis and shortness of breath. Negative for chest tightness and wheezing.    Cardiovascular:  Negative for chest pain, palpitations and leg swelling.   Gastrointestinal:  Negative for abdominal pain, diarrhea, heartburn and vomiting.   Musculoskeletal:  Positive for back pain. Negative for arthralgias and myalgias.   Skin:  Negative for rash.   Neurological:  Negative for headaches.       Objective   Visit Vitals  BP 88/62   Pulse 94   Temp 36 °C (96.8 °F)      BP Readings from Last 3 Encounters:   03/20/25 88/62   02/04/25 122/86   10/16/24  135/87     Wt Readings from Last 3 Encounters:   03/20/25 73.7 kg (162 lb 6.4 oz)   10/16/24 89.6 kg (197 lb 9.6 oz)   06/21/24 86 kg (189 lb 9.6 oz)       Physical Exam  Constitutional:       Appearance: Normal appearance.   HENT:      Head: Normocephalic.      Right Ear: Tympanic membrane, ear canal and external ear normal.      Left Ear: Tympanic membrane, ear canal and external ear normal.      Nose: Nose normal.      Mouth/Throat:      Mouth: Mucous membranes are moist.      Pharynx: No oropharyngeal exudate or posterior oropharyngeal erythema.   Cardiovascular:      Rate and Rhythm: Normal rate and regular rhythm.      Heart sounds: Normal heart sounds.   Pulmonary:      Effort: Pulmonary effort is normal.      Breath sounds: Normal breath sounds. No wheezing, rhonchi or rales.   Lymphadenopathy:      Cervical: No cervical adenopathy.   Neurological:      Mental Status: She is alert.         Assessment/Plan   Diagnoses and all orders for this visit:  Pulmonary nodules  -     CT chest wo IV contrast; Future  Bronchitis  -     azithromycin (Zithromax Z-Thai) 250 mg tablet; Take 2 tabs po the first day and then 1 tab daily days 2-5  -     predniSONE (Deltasone) 20 mg tablet; 3 tabs daily x3d then 2 tabs daily x 3d then 1 tab daily x 3d then 1/2 tab daily x 3d with food  Mild intermittent asthma with acute exacerbation ([facility])  -     CT chest wo IV contrast; Future  -     predniSONE (Deltasone) 20 mg tablet; 3 tabs daily x3d then 2 tabs daily x 3d then 1 tab daily x 3d then 1/2 tab daily x 3d with food  Cigarette smoker motivated to quit  -     CT chest wo IV contrast; Future        See patient instructions for full plan

## 2025-03-20 ENCOUNTER — APPOINTMENT (OUTPATIENT)
Dept: PRIMARY CARE | Facility: CLINIC | Age: 40
End: 2025-03-20
Payer: COMMERCIAL

## 2025-03-20 VITALS
SYSTOLIC BLOOD PRESSURE: 88 MMHG | OXYGEN SATURATION: 98 % | DIASTOLIC BLOOD PRESSURE: 62 MMHG | BODY MASS INDEX: 27.06 KG/M2 | HEIGHT: 65 IN | WEIGHT: 162.4 LBS | HEART RATE: 94 BPM | TEMPERATURE: 96.8 F

## 2025-03-20 DIAGNOSIS — R91.8 PULMONARY NODULES: Primary | ICD-10-CM

## 2025-03-20 DIAGNOSIS — J45.21 MILD INTERMITTENT ASTHMA WITH ACUTE EXACERBATION (HHS-HCC): ICD-10-CM

## 2025-03-20 DIAGNOSIS — J40 BRONCHITIS: ICD-10-CM

## 2025-03-20 DIAGNOSIS — F17.210 CIGARETTE SMOKER MOTIVATED TO QUIT: ICD-10-CM

## 2025-03-20 PROCEDURE — 3008F BODY MASS INDEX DOCD: CPT | Performed by: NURSE PRACTITIONER

## 2025-03-20 PROCEDURE — 99214 OFFICE O/P EST MOD 30 MIN: CPT | Performed by: NURSE PRACTITIONER

## 2025-03-20 RX ORDER — PREDNISONE 20 MG/1
TABLET ORAL
Qty: 20 TABLET | Refills: 0 | Status: SHIPPED | OUTPATIENT
Start: 2025-03-20

## 2025-03-20 RX ORDER — AZITHROMYCIN 250 MG/1
TABLET, FILM COATED ORAL
Qty: 6 TABLET | Refills: 0 | Status: SHIPPED | OUTPATIENT
Start: 2025-03-20

## 2025-03-20 RX ORDER — BENZONATATE 100 MG/1
CAPSULE ORAL
COMMUNITY
Start: 2025-03-12

## 2025-03-20 RX ORDER — ALBUTEROL SULFATE 90 UG/1
2 INHALANT RESPIRATORY (INHALATION) EVERY 4 HOURS PRN
COMMUNITY
Start: 2025-03-12

## 2025-03-20 ASSESSMENT — ENCOUNTER SYMPTOMS
BACK PAIN: 1
WHEEZING: 1
RHINORRHEA: 0
SORE THROAT: 0
HEARTBURN: 0
MYALGIAS: 0
HEMOPTYSIS: 1
CHILLS: 0
WEIGHT LOSS: 0
FEVER: 0
COUGH: 1
SHORTNESS OF BREATH: 1
SWEATS: 1
HEADACHES: 0

## 2025-03-20 ASSESSMENT — PATIENT HEALTH QUESTIONNAIRE - PHQ9
SUM OF ALL RESPONSES TO PHQ9 QUESTIONS 1 AND 2: 0
1. LITTLE INTEREST OR PLEASURE IN DOING THINGS: NOT AT ALL
2. FEELING DOWN, DEPRESSED OR HOPELESS: NOT AT ALL

## 2025-03-20 NOTE — PATIENT INSTRUCTIONS
Zpak  Prednisone with food  No advil, motrin, ibuprofen, aleve, naproxen or other anti-inflammatories while on prednisone.  Tylenol (acetaminophen) is OK to take.   Albuterol inhaler  Tessalon or otc meds are ok  Fluids  Rest  Call if sx worsen or change or not starting to get better in 2-3d    Ct chest    Can use patch 14mg or 7mg and gum/lozenge 2mg to quit  Have 3 things to do when want to smoke    Keep June appt    I will communicate with you via [email or web address] regarding messages and results. If you need help with this, you can call the support line at [phone].    IT WAS A PLEASURE TO SEE YOU TODAY. THANK YOU FOR CHOOSING US FOR YOUR HEALTHCARE NEEDS.

## 2025-04-07 ENCOUNTER — TELEPHONE (OUTPATIENT)
Dept: PRIMARY CARE | Facility: CLINIC | Age: 40
End: 2025-04-07

## 2025-04-07 ENCOUNTER — PATIENT MESSAGE (OUTPATIENT)
Dept: PRIMARY CARE | Facility: CLINIC | Age: 40
End: 2025-04-07
Payer: COMMERCIAL

## 2025-04-07 DIAGNOSIS — R91.1 LUNG NODULE: Primary | ICD-10-CM

## 2025-04-07 NOTE — TELEPHONE ENCOUNTER
10/22/19 ct scan angio chest-no nodule in the report  3/12/25 xray [facility]- tiny RML nodule  Current ct scan 4/2025 LLL nodule    Pt is confused if it is right or left nodule    Pls call [facility] to see if radiologist can confirm it is a LLL nodule and that is it unchanged from 2019 because I don't see a nodule in the 2019 report   AND  If he can look at the RML and see if there is a tiny nodule.

## 2025-04-08 ENCOUNTER — APPOINTMENT (OUTPATIENT)
Dept: NEUROLOGY | Facility: CLINIC | Age: 40
End: 2025-04-08
Payer: COMMERCIAL

## 2025-04-08 VITALS
DIASTOLIC BLOOD PRESSURE: 86 MMHG | SYSTOLIC BLOOD PRESSURE: 118 MMHG | WEIGHT: 159.6 LBS | HEART RATE: 70 BPM | BODY MASS INDEX: 26.59 KG/M2 | HEIGHT: 65 IN | RESPIRATION RATE: 17 BRPM

## 2025-04-08 DIAGNOSIS — G43.009 MIGRAINE WITHOUT AURA AND WITHOUT STATUS MIGRAINOSUS, NOT INTRACTABLE: Primary | ICD-10-CM

## 2025-04-08 PROCEDURE — 99214 OFFICE O/P EST MOD 30 MIN: CPT | Performed by: NURSE PRACTITIONER

## 2025-04-08 PROCEDURE — 3008F BODY MASS INDEX DOCD: CPT | Performed by: NURSE PRACTITIONER

## 2025-04-08 RX ORDER — ATOGEPANT 60 MG/1
60 TABLET ORAL NIGHTLY
Qty: 30 TABLET | Refills: 1 | Status: SHIPPED | OUTPATIENT
Start: 2025-04-08

## 2025-04-08 ASSESSMENT — PATIENT HEALTH QUESTIONNAIRE - PHQ9
3. TROUBLE FALLING OR STAYING ASLEEP OR SLEEPING TOO MUCH: NEARLY EVERY DAY
SUM OF ALL RESPONSES TO PHQ QUESTIONS 1-9: 12
5. POOR APPETITE OR OVEREATING: NOT AT ALL
6. FEELING BAD ABOUT YOURSELF - OR THAT YOU ARE A FAILURE OR HAVE LET YOURSELF OR YOUR FAMILY DOWN: NOT AT ALL
1. LITTLE INTEREST OR PLEASURE IN DOING THINGS: SEVERAL DAYS
SUM OF ALL RESPONSES TO PHQ9 QUESTIONS 1 AND 2: 3
7. TROUBLE CONCENTRATING ON THINGS, SUCH AS READING THE NEWSPAPER OR WATCHING TELEVISION: NEARLY EVERY DAY
2. FEELING DOWN, DEPRESSED OR HOPELESS: MORE THAN HALF THE DAYS
10. IF YOU CHECKED OFF ANY PROBLEMS, HOW DIFFICULT HAVE THESE PROBLEMS MADE IT FOR YOU TO DO YOUR WORK, TAKE CARE OF THINGS AT HOME, OR GET ALONG WITH OTHER PEOPLE: NOT DIFFICULT AT ALL
9. THOUGHTS THAT YOU WOULD BE BETTER OFF DEAD, OR OF HURTING YOURSELF: NOT AT ALL
8. MOVING OR SPEAKING SO SLOWLY THAT OTHER PEOPLE COULD HAVE NOTICED. OR THE OPPOSITE, BEING SO FIGETY OR RESTLESS THAT YOU HAVE BEEN MOVING AROUND A LOT MORE THAN USUAL: NOT AT ALL
4. FEELING TIRED OR HAVING LITTLE ENERGY: NEARLY EVERY DAY

## 2025-04-08 ASSESSMENT — PAIN SCALES - GENERAL: PAINLEVEL_OUTOF10: 0-NO PAIN

## 2025-04-08 NOTE — PROGRESS NOTES
"Pt presents for a follow up migraines. States she went from 16 day of migraines per month to 10 with increased dose of propranolol and the taper dose of steroids to break up the cycle.  Has taken eletriptan 12 per month \"all the pills she gets in one month\" states medication does not work at all.   Subjective   HPI  Lydia Flores is a 39 y.o. year old female who presents with chief complaint migraine without aura.    Lydia is experiencing  10 HA days per month, 10 of the HAs meet migraine criteria.   Triggers include-cannot recall any triggers   Aura-blurry   The headaches are usually stabbing and are located frontal lobe, generally symmetric. Usually starts on one side but ends up being the whole forehead  The patient rates the most severe headaches a 9 in intensity.   Generally, headaches last about 3-5 hours in duration.   Associated nausea, photophobia, phonophobia, vestibular symptoms, and vomiting.   Headaches are worsened with exertion.   The headaches    positional. No change in character with sneezing, coughing and bending over.   The current rescue medications work within 0  hours and decreased the headache by 0  %. The patient  repeats treatment with rescue medication.   Still is ineffective.  Work attendance or other daily activities affected:yes     Exercise: walking  Diet: healthy  Caffeine: minimal, no coffee or energy drinks intake  Sleep: nightshift   Stressors:denies   Hormones: denies   The patient denies the presence of any associated double vision, speech problems, confusion, facial or extremity weakness or numbness or problems with coordination. Denies other neurological history of seizures, stroke, or TIA.        Current/ past HA treatments:  Preventive:  Propranolol  Topiramate    Rescue:  Acetaminophen  Ibuprofen  Eletriptan  Rizatriptan       Current Outpatient Medications:     albuterol 90 mcg/actuation inhaler, Inhale 2 puffs every 4 hours if needed., Disp: , Rfl:     eletriptan " (Relpax) 20 mg tablet, Take 1 tablet (20 mg) by mouth 1 time if needed for migraine. May repeat once after 2 hours., Disp: 12 tablet, Rfl: 11    famotidine (Pepcid) 40 mg tablet, once daily., Disp: , Rfl:     propranolol LA (Inderal LA) 120 mg 24 hr capsule, Take 1 capsule (120 mg) by mouth once daily. Do not crush, chew, or split., Disp: 30 capsule, Rfl: 1    topiramate (Topamax) 100 mg tablet, Take 1 tablet (100 mg) by mouth early in the morning.., Disp: , Rfl:     azithromycin (Zithromax Z-Thai) 250 mg tablet, Take 2 tabs po the first day and then 1 tab daily days 2-5 (Patient not taking: Reported on 2025), Disp: 6 tablet, Rfl: 0    benzonatate (Tessalon) 100 mg capsule, , Disp: , Rfl:     pantoprazole (ProtoNix) 40 mg EC tablet, Take 1 tablet (40 mg) by mouth once daily. Do not crush, chew, or split., Disp: 90 tablet, Rfl: 0    predniSONE (Deltasone) 20 mg tablet, 3 tabs daily x3d then 2 tabs daily x 3d then 1 tab daily x 3d then 1/2 tab daily x 3d with food (Patient not taking: Reported on 2025), Disp: 20 tablet, Rfl: 0    rizatriptan MLT (Maxalt-MLT) 10 mg disintegrating tablet, Take 1 tablet (10 mg) by mouth 1 time if needed for migraine. May repeat in 2 hours if unresolved. Do not exceed 30 mg in 24 hours., Disp: 12 tablet, Rfl: 5    No Active Allergies    Past Medical History:   Diagnosis Date    Acute candidiasis of vulva and vagina 10/20/2014    Yeast vaginitis    ADHD (attention deficit hyperactivity disorder)     Anxiety     Depression     Encounter for screening for malignant neoplasm of cervix 10/30/2013    Screening for malignant neoplasm of cervix    Moderate cervical dysplasia 2014    KANYD II (cervical intraepithelial neoplasia II)    Personal history of other diseases of the respiratory system 10/10/2014    History of acute bronchitis    Seasonal allergies      Past Surgical History:   Procedure Laterality Date     SECTION, CLASSIC  2015     Section      "SECTION, LOW TRANSVERSE  2011, 2009    ESOPHAGEAL DILATION      x8 in [id number]; dr swan    EXPLORATORY LAPAROTOMY  12/2023    abd-piece of fallopian tube noted    HYSTERECTOMY  2019    KNEE SURGERY  05/19/2015    Knee Surgery    OOPHORECTOMY      SALPINGECTOMY      TUBAL LIGATION  05/19/2015    Tubal Ligation     Family History   Problem Relation Name Age of Onset    Rheum arthritis Mother Hannah     Other (liver failure) Mother Hannah     Alcohol abuse Mother Hannah     Endometriosis Mother Hannah     Bipolar disorder Mother Hannah     Depression Mother Hannah     Drug abuse Mother Hannah     Mental illness Mother Hannah     Diabetes Paternal Grandfather Marcus     Cancer Paternal Grandfather Marcus     Cancer Maternal Grandfather Marcus      Social History     Tobacco Use    Smoking status: Every Day     Current packs/day: 0.50     Average packs/day: 0.5 packs/day for 10.0 years (5.0 ttl pk-yrs)     Types: Cigarettes    Smokeless tobacco: Never   Substance Use Topics    Alcohol use: Never     Social History     Substance and Sexual Activity   Drug Use Never        ROS  As noted in HPI, otherwise all other systems have been reviewed are negative for complaint.     Objective   General Appearance: Lydia is well-developed, well-nourished, 39 y.o. year old female, in no acute distress. Makes good eye contact, is alert, interactive, and cooperative. Demonstrates recent & remote memory recall. Subjective information consistent with objective assessment.     Vitals:    04/08/25 1453   BP: 118/86   Pulse: 70   Resp: 17   Weight: 72.4 kg (159 lb 9.6 oz)   Height: 1.651 m (5' 5\")   PainSc: 0-No pain       Lab Results   Component Value Date    HGBA1C 5.3 10/30/2018    CHOL 178 10/30/2018    HDL 38.1 (A) 10/30/2018    TRIG 188 (H) 10/30/2018    TSH 1.05 10/30/2018        Mental Status  Patient Health Questionnaire-2 Score: 3       RECORDS REVIEW:  Previous records (provider notes, laboratory reports, and imaging studies were reviewed " and summarized).    Assessment & Plan  Migraine without aura and without status migrainosus, not intractable              ASSESSMENT/PLAN:  Start qulipta for preventative treatment  Start ubrelvy as abortive treatment  Continue propranolol and topiramate  Depending on response to Qulipta will consider titrating propranolol down  Follow up in 6-8 week to assess for effectiveness of medication.           NANNETTE AZEVEDO APRN student

## 2025-04-09 ENCOUNTER — SPECIALTY PHARMACY (OUTPATIENT)
Dept: PHARMACY | Facility: CLINIC | Age: 40
End: 2025-04-09

## 2025-04-09 NOTE — TELEPHONE ENCOUNTER
Called JOSE and got transferred to [facility] [phone] and put in the request for below.

## 2025-04-13 NOTE — TELEPHONE ENCOUNTER
Pls call [facility] again to see if they can confirm it is a LLL nodule and that is it unchanged from 2019 because I don't see a nodule in the 2019 report   AND  If he can look at the RML and see if there is a tiny nodule.

## 2025-04-15 ENCOUNTER — SPECIALTY PHARMACY (OUTPATIENT)
Dept: PHARMACY | Facility: CLINIC | Age: 40
End: 2025-04-15

## 2025-04-15 PROCEDURE — RXMED WILLOW AMBULATORY MEDICATION CHARGE

## 2025-04-16 NOTE — TELEPHONE ENCOUNTER
"\"The report clearly states the lll pulm nodule is stable from 2019 and benign and no follow up recommended.\" According to radiologist. So he refused to make an addendum on the report     RML was not looked at, they are putting another request in for this.   "

## 2025-04-17 ENCOUNTER — PHARMACY VISIT (OUTPATIENT)
Dept: PHARMACY | Facility: CLINIC | Age: 40
End: 2025-04-17
Payer: COMMERCIAL

## 2025-04-18 ENCOUNTER — OFFICE (OUTPATIENT)
Dept: URBAN - METROPOLITAN AREA CLINIC 27 | Facility: CLINIC | Age: 40
End: 2025-04-18
Payer: COMMERCIAL

## 2025-04-18 VITALS
SYSTOLIC BLOOD PRESSURE: 104 MMHG | TEMPERATURE: 97.9 F | WEIGHT: 157 LBS | HEIGHT: 65 IN | HEART RATE: 94 BPM | DIASTOLIC BLOOD PRESSURE: 73 MMHG

## 2025-04-18 DIAGNOSIS — Z83.79 FAMILY HISTORY OF OTHER DISEASES OF THE DIGESTIVE SYSTEM: ICD-10-CM

## 2025-04-18 DIAGNOSIS — K21.9 GASTRO-ESOPHAGEAL REFLUX DISEASE WITHOUT ESOPHAGITIS: ICD-10-CM

## 2025-04-18 DIAGNOSIS — K20.0 EOSINOPHILIC ESOPHAGITIS: ICD-10-CM

## 2025-04-18 DIAGNOSIS — R19.4 CHANGE IN BOWEL HABIT: ICD-10-CM

## 2025-04-18 PROCEDURE — 99214 OFFICE O/P EST MOD 30 MIN: CPT | Performed by: INTERNAL MEDICINE

## 2025-05-08 PROCEDURE — RXMED WILLOW AMBULATORY MEDICATION CHARGE

## 2025-05-09 ENCOUNTER — SPECIALTY PHARMACY (OUTPATIENT)
Dept: PHARMACY | Facility: CLINIC | Age: 40
End: 2025-05-09

## 2025-05-16 ENCOUNTER — PHARMACY VISIT (OUTPATIENT)
Dept: PHARMACY | Facility: CLINIC | Age: 40
End: 2025-05-16
Payer: COMMERCIAL

## 2025-05-20 ENCOUNTER — APPOINTMENT (OUTPATIENT)
Dept: NEUROLOGY | Facility: CLINIC | Age: 40
End: 2025-05-20
Payer: COMMERCIAL

## 2025-06-01 DIAGNOSIS — G43.009 MIGRAINE WITHOUT AURA AND WITHOUT STATUS MIGRAINOSUS, NOT INTRACTABLE: ICD-10-CM

## 2025-06-02 RX ORDER — ATOGEPANT 60 MG/1
60 TABLET ORAL NIGHTLY
Qty: 30 TABLET | Refills: 0 | Status: SHIPPED | OUTPATIENT
Start: 2025-06-02

## 2025-06-02 NOTE — TELEPHONE ENCOUNTER
She had an appointment in May that she canceled.  Then the June appointment I am on vacation that is why we canceled it.  It looks like she scheduled herself for the August appointment.  She will need to be seen before August.

## 2025-06-02 NOTE — TELEPHONE ENCOUNTER
Last 4-8/25 Next 8-28-25 *her 6/30 appointment was cancelled by office and she was given next available

## 2025-06-12 PROCEDURE — RXMED WILLOW AMBULATORY MEDICATION CHARGE

## 2025-06-20 ENCOUNTER — SPECIALTY PHARMACY (OUTPATIENT)
Dept: PHARMACY | Facility: CLINIC | Age: 40
End: 2025-06-20

## 2025-06-21 ENCOUNTER — PHARMACY VISIT (OUTPATIENT)
Dept: PHARMACY | Facility: CLINIC | Age: 40
End: 2025-06-21
Payer: COMMERCIAL

## 2025-06-23 ENCOUNTER — TELEMEDICINE (OUTPATIENT)
Dept: NEUROLOGY | Facility: CLINIC | Age: 40
End: 2025-06-23
Payer: COMMERCIAL

## 2025-06-23 DIAGNOSIS — G43.009 MIGRAINE WITHOUT AURA AND WITHOUT STATUS MIGRAINOSUS, NOT INTRACTABLE: Primary | ICD-10-CM

## 2025-06-23 PROCEDURE — 99214 OFFICE O/P EST MOD 30 MIN: CPT | Performed by: NURSE PRACTITIONER

## 2025-06-23 RX ORDER — ATOGEPANT 60 MG/1
60 TABLET ORAL NIGHTLY
Qty: 30 TABLET | Refills: 5 | Status: SHIPPED | OUTPATIENT
Start: 2025-06-23

## 2025-06-23 NOTE — ASSESSMENT & PLAN NOTE
Orders:    atogepant (Qulipta) 60 mg tablet; Take 1 tablet (60 mg) by mouth once daily at bedtime.    ubrogepant (Ubrelvy) 100 mg tablet; Take 1 tablet (100 mg) by mouth once if needed for migraine.  May repeat dose after 2 hours if needed.  Do not exceed max dose of 200 mg (2 tablets) per 24 hours.    Follow Up In Neurology; Future

## 2025-06-23 NOTE — PROGRESS NOTES
Virtual or Telephone Consent    An interactive audio and video telecommunication system which permits real time communications between the patient (at the originating site) and provider (at the distant site) was utilized to provide this telehealth service.   Verbal consent was requested and obtained from Lydia Flores on this date, 06/23/25 for a telehealth visit and the patient's location was confirmed at the time of the visit.    Subjective   HPI  Lydia Flores is a 39 y.o. year old female who presents with chief complaint migraine without aura.    Lydia has had 5 migraines since April appointment that meet migraine criteria.   The headaches are usually moderate, pounding, and throbbing and are located behind eye, generally unilateral.   Generally, headaches last about 3 hours in duration.   With Ubrelvy.  Associated photophobia, phonophobia, and nausea  The headaches are not positional.   Headaches are worsened with exertion. No change in character with sneezing, coughing and bending over.   The current rescue medication Ubrelvy starts to take effect within 3    hours and decreases the headache by 100%.   Work attendance or other daily activities affected:  had to leave work early  Caffeine:  denies   Sleep:  7 hours per night  The patient denies the presence of any associated double vision, speech problems, confusion, facial or extremity weakness or numbness or problems with coordination. Denies other neurological history of seizures, stroke, or TIA.        Current/ past HA treatments:  Preventive:  Propranolol  Topiramate    Rescue:  Acetaminophen  Ibuprofen  Eletriptan  Rizatriptan     Current Medications[1]    RX Allergies[2]    Medical History[3]  Surgical History[4]  Family History[5]  Social History     Tobacco Use    Smoking status: Every Day     Current packs/day: 0.50     Average packs/day: 0.5 packs/day for 10.0 years (5.0 ttl pk-yrs)     Types: Cigarettes    Smokeless tobacco: Never    Substance Use Topics    Alcohol use: Never     Social History     Substance and Sexual Activity   Drug Use Never        ROS  As noted in HPI, otherwise all other systems have been reviewed are negative for complaint.     Objective   General Appearance: Lydia is well-developed, well-nourished, 39 y.o. year old female, in no acute distress. Makes good eye contact, is alert, interactive, and cooperative. Demonstrates recent & remote memory recall. Subjective information consistent with objective assessment.       Lab Results   Component Value Date    HGBA1C 5.3 10/30/2018    CHOL 178 10/30/2018    HDL 38.1 (A) 10/30/2018    TRIG 188 (H) 10/30/2018    TSH 1.05 10/30/2018        Neurological Exam  Cranial Nerves  CN VII: Full and symmetric facial movement.  CN VIII: Hearing is normal.    RECORDS REVIEW:  Previous records (provider notes, laboratory reports, and imaging studies were reviewed and summarized).  Labs reviewed.  Previous neuronote reviewed.    Assessment & Plan  Migraine without aura and without status migrainosus, not intractable    Orders:    atogepant (Qulipta) 60 mg tablet; Take 1 tablet (60 mg) by mouth once daily at bedtime.    ubrogepant (Ubrelvy) 100 mg tablet; Take 1 tablet (100 mg) by mouth once if needed for migraine.  May repeat dose after 2 hours if needed.  Do not exceed max dose of 200 mg (2 tablets) per 24 hours.    Follow Up In Neurology; Future         ASSESSMENT/PLAN:  Continue Qulipta 60 mg nightly for preventative treatment of migraine.  Continue Ubrelvy 100 mg for abortive treatment of migraine.  Follow-up in 6 months or sooner if needed.        Lourdes Ruby, APRN-CNP         [1]   Current Outpatient Medications:     albuterol 90 mcg/actuation inhaler, Inhale 2 puffs every 4 hours if needed., Disp: , Rfl:     atogepant (Qulipta) 60 mg tablet, Take 1 tablet (60 mg) by mouth once daily at bedtime., Disp: 30 tablet, Rfl: 0    azithromycin (Zithromax Z-Thai) 250 mg tablet, Take 2 tabs  po the first day and then 1 tab daily days 2-5 (Patient not taking: Reported on 2025), Disp: 6 tablet, Rfl: 0    benzonatate (Tessalon) 100 mg capsule, , Disp: , Rfl:     famotidine (Pepcid) 40 mg tablet, once daily., Disp: , Rfl:     pantoprazole (ProtoNix) 40 mg EC tablet, Take 1 tablet (40 mg) by mouth once daily. Do not crush, chew, or split., Disp: 90 tablet, Rfl: 0    predniSONE (Deltasone) 20 mg tablet, 3 tabs daily x3d then 2 tabs daily x 3d then 1 tab daily x 3d then 1/2 tab daily x 3d with food (Patient not taking: Reported on 2025), Disp: 20 tablet, Rfl: 0    propranolol LA (Inderal LA) 120 mg 24 hr capsule, Take 1 capsule (120 mg) by mouth once daily. Do not crush, chew, or split., Disp: 30 capsule, Rfl: 1    topiramate (Topamax) 100 mg tablet, Take 1 tablet (100 mg) by mouth early in the morning.., Disp: , Rfl:     ubrogepant (Ubrelvy) 100 mg tablet, Take 1 tablet (100 mg) by mouth once if needed for migraine.  May repeat dose after 2 hours if needed.  Do not exceed max dose of 200 mg (2 tablets) per 24 hours., Disp: 16 tablet, Rfl: 0  [2] No Known Allergies  [3]   Past Medical History:  Diagnosis Date    Acute candidiasis of vulva and vagina 10/20/2014    Yeast vaginitis    ADHD (attention deficit hyperactivity disorder)     Anxiety     Depression     Encounter for screening for malignant neoplasm of cervix 10/30/2013    Screening for malignant neoplasm of cervix    Moderate cervical dysplasia 2014    KANDY II (cervical intraepithelial neoplasia II)    Personal history of other diseases of the respiratory system 10/10/2014    History of acute bronchitis    Seasonal allergies    [4]   Past Surgical History:  Procedure Laterality Date     SECTION, CLASSIC  2015     Section     SECTION, LOW TRANSVERSE  ,     ESOPHAGEAL DILATION      x8 in [id number]; dr swan    EXPLORATORY LAPAROTOMY  2023    abd-piece of fallopian tube noted    HYSTERECTOMY       KNEE SURGERY  05/19/2015    Knee Surgery    OOPHORECTOMY      SALPINGECTOMY      TUBAL LIGATION  05/19/2015    Tubal Ligation   [5]   Family History  Problem Relation Name Age of Onset    Rheum arthritis Mother Hannah     Other (liver failure) Mother Hannah     Alcohol abuse Mother Hannah     Endometriosis Mother Hannah     Bipolar disorder Mother Hannah     Depression Mother Hannah     Drug abuse Mother Hannah     Mental illness Mother Hannah     Diabetes Paternal Grandfather Marcus     Cancer Paternal Grandfather Marcus     Cancer Maternal Grandfather Marcus

## 2025-06-25 ENCOUNTER — APPOINTMENT (OUTPATIENT)
Dept: PRIMARY CARE | Facility: CLINIC | Age: 40
End: 2025-06-25
Payer: COMMERCIAL

## 2025-06-30 ENCOUNTER — APPOINTMENT (OUTPATIENT)
Dept: NEUROLOGY | Facility: CLINIC | Age: 40
End: 2025-06-30
Payer: COMMERCIAL

## 2025-07-10 ENCOUNTER — SPECIALTY PHARMACY (OUTPATIENT)
Dept: PHARMACY | Facility: CLINIC | Age: 40
End: 2025-07-10

## 2025-07-21 PROCEDURE — RXMED WILLOW AMBULATORY MEDICATION CHARGE

## 2025-07-23 ENCOUNTER — PHARMACY VISIT (OUTPATIENT)
Dept: PHARMACY | Facility: CLINIC | Age: 40
End: 2025-07-23
Payer: COMMERCIAL

## 2025-07-23 ASSESSMENT — ENCOUNTER SYMPTOMS
TROUBLE SWALLOWING: 0
FREQUENCY: 0
HEMATURIA: 0
SHORTNESS OF BREATH: 0
FEVER: 0
BACK PAIN: 0
HEADACHES: 0
EYE DISCHARGE: 0
POLYPHAGIA: 0
PALPITATIONS: 0
COUGH: 0
DIZZINESS: 0
SORE THROAT: 0
APPETITE CHANGE: 0
CHILLS: 0
DYSURIA: 0
ADENOPATHY: 0
HALLUCINATIONS: 0
BRUISES/BLEEDS EASILY: 0
UNEXPECTED WEIGHT CHANGE: 0
ABDOMINAL PAIN: 0
POLYDIPSIA: 0
EYE REDNESS: 0
CONFUSION: 0
BLOOD IN STOOL: 0
FATIGUE: 0
NECK PAIN: 0
WOUND: 0
EYE PAIN: 0

## 2025-07-23 NOTE — PROGRESS NOTES
"Subjective   Patient ID: Lydia Flores is a 39 y.o. female who presents for Nausea (Nausea for last 4-5 days, mostly in AM; losing hair; body aches; ) and Neck Pain (Neck pain and numbness for 2-3 days ago).      Is pt fasting?  Yes  Does pt see any providers other than care team below?  Cosmo Ruby,  \"I've seen so many specialists, unless I see them on a regular basis, I don't know their names\"  alyssa Pierre yuen, segun, , zuleyma choudhury  Does pt see a psychiatrist? Yes  If yes name- Chika Coleman  Does pt have a therapist? No    Any forms to fill out? None  Is pt still quit from smoking?  Yes  LMP- Hysterectomy  When did nausea and malaise start? 2-3 days  Any vomiting? Three times yesterday  Any abd pain? No  Did pt call dr swan about the sx? No  Does pt take otc or rx vitamin d? No  Any other questions or concerns that pt wants to discuss today?  None    Phq9= 9  , gad7=0    No albuterol inh at home    No care team member to display    HPI    Last labs-10/2024 cbc nl, cmp nl  6/2024 Ldl slightly high at 108. Goal <100. Decr fats and incr fiber.  Trigs high at 235. Goal <150. Decr carbs and sugars.  Hdl low at 44. Goal >50 for women. Incr exercise.  CBC (complete blood count) was normal which looks at infection and anemia markers.  TSH (thyroid test) was normal.  Sugar (aka glucose), kidney function, liver function and electrolytes in the CMP (comprehensive metabolic panel) were normal.  Due for labs- all    Cholesterol   Date Value Ref Range Status   10/30/2018 178 0 - 199 mg/dL Final     Comment:     .      AGE      DESIRABLE   BORDERLINE HIGH   HIGH     0-19 Y     0 - 169       170 - 199     >/= 200    20-24 Y     0 - 189       190 - 224     >/= 225         >24 Y     0 - 199       200 - 239     >/= 240   **All ranges are based on fasting samples. Specific   therapeutic targets will vary based on patient-specific   cardiac risk.  .   Pediatric guidelines reference:Pediatrics 2011, " "128(S5).   Adult guidelines reference: NCEP ATPIII Guidelines,     EVELYN 2001, 258:2486-97  .   Venipuncture immediately after or during the    administration of Metamizole may lead to falsely   low results. Testing should be performed immediately   prior to Metamizole dosing.       Triglycerides   Date Value Ref Range Status   10/30/2018 188 (H) 0 - 149 mg/dL Final     Comment:     .      AGE      DESIRABLE   BORDERLINE HIGH   HIGH     VERY HIGH   0 D-90 D    19 - 174         ----         ----        ----  91 D- 9 Y     0 -  74        75 -  99     >/= 100      ----    10-19 Y     0 -  89        90 - 129     >/= 130      ----    20-24 Y     0 - 114       115 - 149     >/= 150      ----         >24 Y     0 - 149       150 - 199    200- 499    >/= 500  .   Venipuncture immediately after or during the    administration of Metamizole may lead to falsely   low results. Testing should be performed immediately   prior to Metamizole dosing.       HDL   Date Value Ref Range Status   10/30/2018 38.1 (A) mg/dL Final     Comment:     .      AGE      VERY LOW   LOW     NORMAL    HIGH       0-19 Y       < 35   < 40     40-45     ----    20-24 Y       ----   < 40       >45     ----      >24 Y       ----   < 40     40-60      >60  .       No results found for: \"LDL\"  TSH   Date Value Ref Range Status   10/30/2018 1.05 0.44 - 3.98 mIU/L Final     Comment:      TSH testing is performed using different testing    methodology at [facility] than at other    [facility]. Direct result comparisons should    only be made within the same method.  .   Patients receiving more than 5 mg/day of biotin may have interference   in test results.  A sample should be taken no sooner than eight hours   after  previous dose. Contact [phone] for additional information.       No results found for: \"A1C\"  No components found for: \"POCA1C\"  No results found for: \"ALBUR\"  No components found for: \"POCALBUR\"    CANCEL SEPT " APPT    Other concerns: neck numbness base of neck x 2-3d  Worse pain when turn head lf or rt  No arm tingling numbness weakness  No fall or injury  Selftxt: stretches    Losing hair x 1mon  No changes in products    Body aches x1mon  Waking up in am on off hands knees, elbows pain  No jt swelling or redness    malaise and nausea x 4-5d ago; starts 4-5a and better around noon; already sleeps elevated; acid reflux better  Vomited 3 times yesterday-bile; no vomiting today  No abd pain  Did not call gi dr-dr swan  No gas, belching, bloating, diarrhea, constipation,  fever, chills, heartburn, acid taste in throat, wt loss, yellow skin, choking or swallowing difficulty  last stool-yesterday am  no blood in stool  last egd-3-4 yrs ago  has seen a GI dr-yes graciela freeman  loss of appetite-when nauseated  fluids-yes  has urinated 3+ times in the last 24hrs  no dry mouth  no new meds or otc meds  no diet changes  no recent travel  no recent abx  no new exercise routine  no dysuria, frequency, change in stream or flow, urgency, hematuria, lbp, change in vaginal d/c  no cough, sob, wheezing, tight upper chest, hoarseness, runny nose, cp, heart racing, skips/pauses, ST, rash, HA, dizziness  stress-best it has been  selftxt-none  no one around pt with similar sx  FH crohns, UC, esophageal cancer, armas's, hiatal hernia, IBS or other gi issues-none        bps at home- none    ER/urgicare visits in the last year- ER 1/2025 flu sx, 10/2024 HA  Hospitalizations in the last year- none    last Pap- 10/17/23; hyster-no cervix-only ovaries in  H/o abn pap-abn and precanc cells on cervix and then hyster  FH ovarian, cervical, uterine ca-mom-adopted-none dads side    Last mammo-7/2024 Mammogram and ultrasound normal.  Breast ducts noted in the area of concern.  No mass noted.  Recommend screening mammogram at age 40.  See ob gyn if you are having continued pain.  FH br ca-mom adopted-none-none dad's side    FH colon ca-mom-adopted-none  dad's side    Ct chest 4/2025 LLL lung nodule benign    Exercise- walk [facility]  Diet-1-2 meals   Body mass index is 24.96 kg/m².    Lost 12lbs since march; not trying to lose wt    last dental appt-4/2025  Dental work scheduled in aug    BMs-regular  Sleep-able to fall asleep but hard to stay asleep; no snoring or apnea  no cp, swelling, sob, abd pain, n/v/d/c, blood in stool or black stools  STI testing including hiv (age 15-65) and hep c screening (18-79)-declines        Immunization History   Administered Date(s) Administered    COVID-19, mRNA, LNP-S, PF, 30 mcg/0.3 mL dose 11/08/2021, 11/29/2021    DTP 1987, 02/11/1988, 04/18/1988    DTaP, Unspecified 06/26/1991, 05/26/1992    Flu vaccine, quadrivalent, no egg protein, age 6 month or greater (FLUCELVAX) 11/15/2018    Flu vaccine, trivalent, preservative free, age 6 months and greater (Fluarix/Fluzone/Flulaval) 10/16/2024    HiB, unspecified 06/26/1989    Influenza, Unspecified 11/17/2010    MMR vaccine, subcutaneous (MMR II) 01/19/1989, 08/22/2000, 04/07/2017    Novel influenza-H1N1-09, preservative-free 12/05/2009    Pneumococcal polysaccharide vaccine, 23-valent, age 2 years and older (PNEUMOVAX 23) 08/04/2010    Polio, Unspecified 1987, 04/18/1988, 06/26/1991, 05/26/1993    Tdap vaccine, age 7 year and older (BOOSTRIX, ADACEL) 01/22/2019         fractures in lifetime-rt fingers  Anyone with osteoporosis in the family-mom-adopted; none dads side    FH heart attack, heart surgery-mom-adopted;none dads side  FH stroke-mom-adopted; none dads side    The ASCVD Risk score (Dominic GREY, et al., 2019) failed to calculate for the following reasons:    The 2019 ASCVD risk score is only valid for ages 40 to 79  Coronary Artery Calcium score:  This test is recommended for men 45 or older and women 55 or older without a history of heart disease and have 1 risk factor (high LDL cholesterol, low HDL cholesterol, high blood pressure, smoker (current or  past), type 2 diabetes, IBD, lupus, RA, ankylosing spondylitis, psoriasis or family history of  heart disease <55yrs in dad, brother or child or <65yrs in mom, sister, or child.)       Review of Systems   Constitutional:  Negative for appetite change, chills, fatigue, fever and unexpected weight change.        Malaise   HENT:  Negative for congestion, ear pain, sore throat and trouble swallowing.    Eyes:  Negative for pain, discharge and redness.   Respiratory:  Negative for cough and shortness of breath.    Cardiovascular:  Negative for chest pain and palpitations.   Gastrointestinal:  Positive for nausea and vomiting. Negative for abdominal pain, blood in stool, constipation and diarrhea.   Endocrine: Negative for polydipsia, polyphagia and polyuria.   Genitourinary:  Negative for dysuria, frequency, hematuria and urgency.   Musculoskeletal:  Positive for arthralgias and myalgias. Negative for back pain and neck pain.        Base of neck numbness   Skin:  Negative for rash and wound.        Hair loss   Allergic/Immunologic: Negative for immunocompromised state.   Neurological:  Negative for dizziness, syncope and headaches.   Hematological:  Negative for adenopathy. Does not bruise/bleed easily.   Psychiatric/Behavioral:  Negative for confusion and hallucinations.        Objective   Visit Vitals  /78   Pulse 94   Temp 37.2 °C (98.9 °F) (Temporal)        BP Readings from Last 3 Encounters:   07/24/25 111/78   04/08/25 118/86   03/20/25 88/62     Wt Readings from Last 3 Encounters:   07/24/25 68 kg (150 lb)   04/08/25 72.4 kg (159 lb 9.6 oz)   03/20/25 73.7 kg (162 lb 6.4 oz)           Physical Exam  Constitutional:       General: She is not in acute distress.     Appearance: Normal appearance. She is not ill-appearing.   HENT:      Head: Normocephalic.      Right Ear: Tympanic membrane, ear canal and external ear normal.      Left Ear: Tympanic membrane, ear canal and external ear normal.      Nose: Nose  normal.      Mouth/Throat:      Mouth: Mucous membranes are moist.      Pharynx: Oropharynx is clear.     Eyes:      Extraocular Movements: Extraocular movements intact.      Conjunctiva/sclera: Conjunctivae normal.      Pupils: Pupils are equal, round, and reactive to light.       Cardiovascular:      Rate and Rhythm: Normal rate and regular rhythm.      Heart sounds: Normal heart sounds. No murmur heard.  Pulmonary:      Effort: Pulmonary effort is normal. No respiratory distress.      Breath sounds: Normal breath sounds. No wheezing, rhonchi or rales.   Abdominal:      General: Bowel sounds are normal. There is no distension.      Palpations: Abdomen is soft. There is no mass.      Tenderness: There is no abdominal tenderness. There is no guarding or rebound.     Musculoskeletal:         General: No swelling or tenderness. Normal range of motion.      Cervical back: Normal range of motion and neck supple.      Right lower leg: No edema.      Left lower leg: No edema.      Comments: No pain to palpate neck but different sensation. FROM but causes worsening numbness when looking left and right     Skin:     General: Skin is warm.      Findings: No rash.     Neurological:      General: No focal deficit present.      Mental Status: She is alert and oriented to person, place, and time.      Cranial Nerves: No cranial nerve deficit.      Motor: No weakness.     Psychiatric:         Mood and Affect: Mood normal.         Behavior: Behavior normal.         Assessment/Plan   Diagnoses and all orders for this visit:  Routine general medical examination at a health care facility  -     Comprehensive Metabolic Panel; Future  -     CBC and Auto Differential; Future  -     Lipid Panel; Future  -     TSH with reflex to Free T4 if abnormal; Future  Vitamin D deficiency  -     Vitamin D 25-Hydroxy,Total (for eval of Vitamin D levels); Future  Nausea and vomiting, unspecified vomiting type  -     ondansetron (Zofran) 4 mg tablet;  Take 1 tablet (4 mg) by mouth every 8 hours if needed for nausea or vomiting for up to 10 days.  -     US abdomen complete; Future  -     Urine Culture; Future  -     POCT UA Automated manually resulted; Future  Back pain associated with peripheral numbness  -     predniSONE (Deltasone) 20 mg tablet; 3 tabs daily x3d then 2 tabs daily x 3d then 1 tab daily x 3d then 1/2 tab daily x 3d with food  -     orphenadrine (Norflex) 100 mg 12 hr tablet; Take 1 tablet (100 mg) by mouth 2 times a day as needed for muscle spasms or mild pain (1 - 3). Do not crush, chew, or split.  Hair loss  -     Zinc, Serum or Plasma; Future  -     Triiodothyronine, Free; Future  -     Phosphorus; Future  -     Ferritin; Future  -     Iron and TIBC; Future  -     Follicle Stimulating Hormone; Future  -     Luteinizing Hormone; Future  -     DHEA; Future  -     Testosterone; Future  -     Prolactin; Future  Myalgia  -     Creatine Kinase; Future  Arthralgia, unspecified joint  -     Rheumatoid Factor; Future  -     Sedimentation Rate; Future  -     LINDA with Reflex to AUGUSTO; Future

## 2025-07-24 ENCOUNTER — OFFICE VISIT (OUTPATIENT)
Dept: PRIMARY CARE | Facility: CLINIC | Age: 40
End: 2025-07-24
Payer: COMMERCIAL

## 2025-07-24 VITALS
OXYGEN SATURATION: 98 % | BODY MASS INDEX: 24.99 KG/M2 | TEMPERATURE: 98.9 F | HEIGHT: 65 IN | HEART RATE: 94 BPM | DIASTOLIC BLOOD PRESSURE: 78 MMHG | SYSTOLIC BLOOD PRESSURE: 111 MMHG | WEIGHT: 150 LBS

## 2025-07-24 DIAGNOSIS — M54.9 BACK PAIN ASSOCIATED WITH PERIPHERAL NUMBNESS: ICD-10-CM

## 2025-07-24 DIAGNOSIS — M79.10 MYALGIA: ICD-10-CM

## 2025-07-24 DIAGNOSIS — R20.0 BACK PAIN ASSOCIATED WITH PERIPHERAL NUMBNESS: ICD-10-CM

## 2025-07-24 DIAGNOSIS — E55.9 VITAMIN D DEFICIENCY: ICD-10-CM

## 2025-07-24 DIAGNOSIS — M25.50 ARTHRALGIA, UNSPECIFIED JOINT: ICD-10-CM

## 2025-07-24 DIAGNOSIS — Z00.00 ROUTINE GENERAL MEDICAL EXAMINATION AT A HEALTH CARE FACILITY: Primary | ICD-10-CM

## 2025-07-24 DIAGNOSIS — R11.2 NAUSEA AND VOMITING, UNSPECIFIED VOMITING TYPE: ICD-10-CM

## 2025-07-24 DIAGNOSIS — L65.9 HAIR LOSS: ICD-10-CM

## 2025-07-24 LAB
POC APPEARANCE, URINE: CLEAR
POC BILIRUBIN, URINE: ABNORMAL
POC BLOOD, URINE: ABNORMAL
POC COLOR, URINE: YELLOW
POC GLUCOSE, URINE: NEGATIVE MG/DL
POC KETONES, URINE: NEGATIVE MG/DL
POC LEUKOCYTES, URINE: NEGATIVE
POC NITRITE,URINE: NEGATIVE
POC PH, URINE: 6 PH
POC PROTEIN, URINE: NEGATIVE MG/DL
POC SPECIFIC GRAVITY, URINE: 1.02
POC UROBILINOGEN, URINE: 0.2 EU/DL

## 2025-07-24 PROCEDURE — 3008F BODY MASS INDEX DOCD: CPT | Performed by: NURSE PRACTITIONER

## 2025-07-24 PROCEDURE — 99214 OFFICE O/P EST MOD 30 MIN: CPT | Performed by: NURSE PRACTITIONER

## 2025-07-24 PROCEDURE — 81003 URINALYSIS AUTO W/O SCOPE: CPT | Performed by: NURSE PRACTITIONER

## 2025-07-24 PROCEDURE — 99395 PREV VISIT EST AGE 18-39: CPT | Performed by: NURSE PRACTITIONER

## 2025-07-24 RX ORDER — ONDANSETRON 4 MG/1
4 TABLET, FILM COATED ORAL EVERY 8 HOURS PRN
Qty: 30 TABLET | Refills: 0 | Status: SHIPPED | OUTPATIENT
Start: 2025-07-24 | End: 2025-08-03

## 2025-07-24 RX ORDER — PREDNISONE 20 MG/1
TABLET ORAL
Qty: 20 TABLET | Refills: 0 | Status: SHIPPED | OUTPATIENT
Start: 2025-07-24

## 2025-07-24 RX ORDER — ORPHENADRINE CITRATE 100 MG/1
100 TABLET, EXTENDED RELEASE ORAL 2 TIMES DAILY PRN
Qty: 60 TABLET | Refills: 1 | Status: SHIPPED | OUTPATIENT
Start: 2025-07-24 | End: 2026-01-20

## 2025-07-24 ASSESSMENT — ANXIETY QUESTIONNAIRES
6. BECOMING EASILY ANNOYED OR IRRITABLE: NOT AT ALL
3. WORRYING TOO MUCH ABOUT DIFFERENT THINGS: NOT AT ALL
1. FEELING NERVOUS, ANXIOUS, OR ON EDGE: NOT AT ALL
5. BEING SO RESTLESS THAT IT IS HARD TO SIT STILL: NOT AT ALL
GAD7 TOTAL SCORE: 0
2. NOT BEING ABLE TO STOP OR CONTROL WORRYING: NOT AT ALL
4. TROUBLE RELAXING: NOT AT ALL
7. FEELING AFRAID AS IF SOMETHING AWFUL MIGHT HAPPEN: NOT AT ALL

## 2025-07-24 ASSESSMENT — PATIENT HEALTH QUESTIONNAIRE - PHQ9
8. MOVING OR SPEAKING SO SLOWLY THAT OTHER PEOPLE COULD HAVE NOTICED. OR THE OPPOSITE, BEING SO FIGETY OR RESTLESS THAT YOU HAVE BEEN MOVING AROUND A LOT MORE THAN USUAL: NOT AT ALL
9. THOUGHTS THAT YOU WOULD BE BETTER OFF DEAD, OR OF HURTING YOURSELF: NOT AT ALL
1. LITTLE INTEREST OR PLEASURE IN DOING THINGS: NOT AT ALL
SUM OF ALL RESPONSES TO PHQ9 QUESTIONS 1 AND 2: 0
SUM OF ALL RESPONSES TO PHQ QUESTIONS 1-9: 9
2. FEELING DOWN, DEPRESSED OR HOPELESS: NOT AT ALL
4. FEELING TIRED OR HAVING LITTLE ENERGY: NEARLY EVERY DAY
6. FEELING BAD ABOUT YOURSELF - OR THAT YOU ARE A FAILURE OR HAVE LET YOURSELF OR YOUR FAMILY DOWN: NOT AT ALL
3. TROUBLE FALLING OR STAYING ASLEEP OR SLEEPING TOO MUCH: NEARLY EVERY DAY
5. POOR APPETITE OR OVEREATING: NEARLY EVERY DAY
7. TROUBLE CONCENTRATING ON THINGS, SUCH AS READING THE NEWSPAPER OR WATCHING TELEVISION: NOT AT ALL

## 2025-07-24 ASSESSMENT — ENCOUNTER SYMPTOMS
ARTHRALGIAS: 1
DIARRHEA: 0
MYALGIAS: 1
NAUSEA: 1
VOMITING: 1
ROS SKIN COMMENTS: HAIR LOSS
CONSTIPATION: 0

## 2025-07-24 NOTE — PATIENT INSTRUCTIONS
Zofran for nausea/vomiting  Ultrasound abdomen  Urine sample for uti  Next see dr swan if above not giving an answer or sx worsen  To ER if blood in stool or worsening abdominal pain    Valerian root-staying asleep    Labs for losing hair and body aches  Next derm for hair loss  Next rheum for body aches    Prednisone with food  No advil, motrin, ibuprofen, aleve, naproxen or other anti-inflammatories while on prednisone.  Tylenol (acetaminophen) is OK to take.   Orphenadrine-watch for drowsiness  Next physical therapy then ortho      Handouts given to pt:  physical handout        Labs- No appt needed:    You can use the lab in our building when fasting. The hrs are: Mon-Fri 7a-330p.  No Saturday hrs. Bring the paper order.   OR   [facility] Mon-Fri 7a-12p. No Saturday hrs. Bring the paper order.  OR   [facility] Mon-Fri 630a-530p or Sat 6:30a-12p. Bring the paper order  OR  [facility] Mon-FrI 7a-5p  [facility] Mon-Fri 730a-4p, Sat  8a-12p  [facility] [address] Mon-Thurs 630a-6p , Fri 630a-4p, Sat 8a-12p  [facility] [address] Mon-Fri 7a-630p and Sat. 7a-3p    Fasting is no food, drink, gum or mints other than water for 12 hrs.   Results will be back in 2-3 business days for most labs. It is always recommended for any orders (labs, xrays, ultrasounds,MRI, ct scan, procedures etc) to check with your insurance provider for expected costs or expenses to you.         You will get your results via phone from my medical assistant if you do not have MyChart.  OR  You will get your results via DataPromt    If a result is urgent, I will call to speak to you.    Vaccines:    Up to date    General recommendations:  Exercise-cardio 4-5d/wk 30min each day  Diet-Breakfast-toast (my favorite Piedad Olivarez Delighful Multigrain or Sathish's Killer Bread Good Seed thin-sliced)/bagel/English muffin-whole wheat flour as a 1st ingredient or cereal/oatmeal/granola bar-fiber 4g or  more or protein like eggs or peanut butter; optional veggies  Lunch-protein, 1/2c carb or 2 slices bread, veg 1c  Dinner-protein, fist sized carb, veg 1c  Fruit 2 a day  Dairy 2 a day-milk, soy milk, almond milk, cheese, yogurt, cottage cheese  Snacks-Protein-hard boiled egg, nuts (walnuts/almonds/pecans/pistachios 1/4c), hummus, beef/deer jerky or meat sticks; vegetable, fruit, dairy-milk(1%, skim, almond, soy)/cheese (not a lot of cheddar)/yogurt (Greek is best-my favorite Dannon Fruit on the Bottom Greek)/cottage cheese 2%; triscuits/ popcorn/wheat thins have a lot of fiber; follow serving size on bag/box/container  increase water  Limit alcohol to 1 drink per day for women and 2 drinks per day for men (1 drink=12oz beer or 5oz wine or 1 1/2oz liquor)  Calcium: 500mg 1 twice a day if age 50 and younger and 600mg 1 twice a day if over age 50 (calcium citrate can be taken without food)  Vitamin D: 800-5000 IU/day  Limit salt to <2300mg a day if age 50 and under and <1500mg a day if over age 50/have high bp or diabetes or kidney disease  Recommend folate for childbearing age women 0.4mg per day (can be found in a multivitamin)  Recommend 18mg/dL of iron a day if age 50 and under and 8mg/dL a day if over age 50; take on an empty stomach at bedtime  Use sunscreen   Wear seatbelt  Recommend safe sex practices: using condoms everytime you have sex, discuss with a new partner about their past partners/history of STDs/drug use, avoid drinking alcohol or using drugs as this increases the chance that you will participate in high-risk sex, for oral sex help protect your mouth by having your partner use a condom (male or female), women should not douche after sex, be aware of your partner's body and your body-look for signs of a sore, blister, rash, or discharge, and have regular exams and periodic tests for STDs.  No distracted driving  No driving when under influence of substances  Wear a seatbelt  Eye dr every  1-2yrs  Dentist every 6-12 mon  Tetanus shot every 10yrs  Recommend flu vaccine in the fall  Appt in  1 year for physical      I will communicate with you via Mambu regarding messages and results. If you need help with this, you can call the support line at [phone].    IT WAS A PLEASURE TO SEE YOU TODAY. THANK YOU FOR CHOOSING US FOR YOUR HEALTHCARE NEEDS.

## 2025-07-25 LAB
25(OH)D3+25(OH)D2 SERPL-MCNC: 29 NG/ML (ref 30–100)
ALBUMIN SERPL-MCNC: 4.7 G/DL (ref 3.6–5.1)
ALP SERPL-CCNC: 42 U/L (ref 31–125)
ALT SERPL-CCNC: 12 U/L (ref 6–29)
ANION GAP SERPL CALCULATED.4IONS-SCNC: 10 MMOL/L (CALC) (ref 7–17)
AST SERPL-CCNC: 12 U/L (ref 10–30)
BASOPHILS # BLD AUTO: 80 CELLS/UL (ref 0–200)
BASOPHILS NFR BLD AUTO: 1.5 %
BILIRUB SERPL-MCNC: 0.8 MG/DL (ref 0.2–1.2)
BUN SERPL-MCNC: 12 MG/DL (ref 7–25)
CALCIUM SERPL-MCNC: 9.3 MG/DL (ref 8.6–10.2)
CHLORIDE SERPL-SCNC: 108 MMOL/L (ref 98–110)
CHOLEST SERPL-MCNC: 155 MG/DL
CHOLEST/HDLC SERPL: 3.3 (CALC)
CO2 SERPL-SCNC: 20 MMOL/L (ref 20–32)
CREAT SERPL-MCNC: 0.71 MG/DL (ref 0.5–0.97)
EGFRCR SERPLBLD CKD-EPI 2021: 112 ML/MIN/1.73M2
EOSINOPHIL # BLD AUTO: 424 CELLS/UL (ref 15–500)
EOSINOPHIL NFR BLD AUTO: 8 %
ERYTHROCYTE [DISTWIDTH] IN BLOOD BY AUTOMATED COUNT: 13.9 % (ref 11–15)
GLUCOSE SERPL-MCNC: 85 MG/DL (ref 65–99)
HCT VFR BLD AUTO: 44.6 % (ref 35–45)
HDLC SERPL-MCNC: 47 MG/DL
HGB BLD-MCNC: 13.7 G/DL (ref 11.7–15.5)
LDLC SERPL CALC-MCNC: 91 MG/DL (CALC)
LYMPHOCYTES # BLD AUTO: 2109 CELLS/UL (ref 850–3900)
LYMPHOCYTES NFR BLD AUTO: 39.8 %
MCH RBC QN AUTO: 25 PG (ref 27–33)
MCHC RBC AUTO-ENTMCNC: 30.7 G/DL (ref 32–36)
MCV RBC AUTO: 81.4 FL (ref 80–100)
MONOCYTES # BLD AUTO: 313 CELLS/UL (ref 200–950)
MONOCYTES NFR BLD AUTO: 5.9 %
NEUTROPHILS # BLD AUTO: 2374 CELLS/UL (ref 1500–7800)
NEUTROPHILS NFR BLD AUTO: 44.8 %
NONHDLC SERPL-MCNC: 108 MG/DL (CALC)
PLATELET # BLD AUTO: 189 THOUSAND/UL (ref 140–400)
PMV BLD REES-ECKER: 11.1 FL (ref 7.5–12.5)
POTASSIUM SERPL-SCNC: 3.5 MMOL/L (ref 3.5–5.3)
PROT SERPL-MCNC: 6.6 G/DL (ref 6.1–8.1)
RBC # BLD AUTO: 5.48 MILLION/UL (ref 3.8–5.1)
SODIUM SERPL-SCNC: 138 MMOL/L (ref 135–146)
TRIGL SERPL-MCNC: 84 MG/DL
TSH SERPL-ACNC: 0.84 MIU/L
WBC # BLD AUTO: 5.3 THOUSAND/UL (ref 3.8–10.8)

## 2025-07-26 LAB
ANA PAT SER IF-IMP: ABNORMAL
ANA SER QL IF: POSITIVE
ANA TITR SER IF: ABNORMAL TITER
BACTERIA UR CULT: NORMAL
CENTROMERE B AB SER-ACNC: ABNORMAL AI
CK SERPL-CCNC: 29 U/L (ref 20–239)
DSDNA AB SER-ACNC: 1 IU/ML
ENA JO1 AB SER IA-ACNC: ABNORMAL AI
ENA RNP AB SER-ACNC: ABNORMAL AI
ENA SCL70 AB SER IA-ACNC: ABNORMAL AI
ENA SM AB SER IA-ACNC: ABNORMAL AI
ENA SM+RNP AB SER IA-ACNC: ABNORMAL AI
ENA SS-A AB SER IA-ACNC: ABNORMAL AI
ENA SS-B AB SER IA-ACNC: ABNORMAL AI
ERYTHROCYTE [SEDIMENTATION RATE] IN BLOOD BY WESTERGREN METHOD: 2 MM/H
FERRITIN SERPL-MCNC: 52 NG/ML (ref 16–154)
FSH SERPL-ACNC: 7.1 MIU/ML
IRON SATN MFR SERPL: 43 % (CALC) (ref 16–45)
IRON SERPL-MCNC: 120 MCG/DL (ref 40–190)
LABORATORY COMMENT REPORT: ABNORMAL
LH SERPL-ACNC: 8.9 MIU/ML
NUCLEOSOME AB SER IA-ACNC: ABNORMAL AI
PHOSPHATE SERPL-MCNC: 3.6 MG/DL (ref 2.5–4.5)
PROLACTIN SERPL-MCNC: 16.6 NG/ML
RHEUMATOID FACT SERPL-ACNC: <10 IU/ML
RIBOSOMAL P AB SER-ACNC: ABNORMAL AI
T3FREE SERPL-MCNC: 3.7 PG/ML (ref 2.3–4.2)
TESTOST SERPL-MCNC: NORMAL NG/DL
TIBC SERPL-MCNC: 280 MCG/DL (CALC) (ref 250–450)
U DHEA SERPL-MCNC: NORMAL MG/L
ZINC SERPL-MCNC: NORMAL UG/ML

## 2025-07-27 DIAGNOSIS — R76.8 ANA POSITIVE: Primary | ICD-10-CM

## 2025-07-27 DIAGNOSIS — R31.9 HEMATURIA, UNSPECIFIED TYPE: ICD-10-CM

## 2025-07-28 DIAGNOSIS — G43.009 MIGRAINE WITHOUT AURA AND WITHOUT STATUS MIGRAINOSUS, NOT INTRACTABLE: Primary | ICD-10-CM

## 2025-07-29 DIAGNOSIS — G43.009 MIGRAINE WITHOUT AURA AND WITHOUT STATUS MIGRAINOSUS, NOT INTRACTABLE: ICD-10-CM

## 2025-07-29 DIAGNOSIS — G43.009 MIGRAINE WITHOUT AURA AND WITHOUT STATUS MIGRAINOSUS, NOT INTRACTABLE: Primary | ICD-10-CM

## 2025-07-29 RX ORDER — PROPRANOLOL HYDROCHLORIDE 120 MG/1
120 CAPSULE, EXTENDED RELEASE ORAL NIGHTLY
Qty: 30 CAPSULE | Refills: 3 | Status: SHIPPED | OUTPATIENT
Start: 2025-07-29 | End: 2025-07-30

## 2025-07-30 RX ORDER — PROPRANOLOL HYDROCHLORIDE 120 MG/1
CAPSULE, EXTENDED RELEASE ORAL
Qty: 90 CAPSULE | Refills: 0 | Status: SHIPPED | OUTPATIENT
Start: 2025-07-30

## 2025-08-01 LAB
ANA PAT SER IF-IMP: ABNORMAL
ANA SER QL IF: POSITIVE
ANA TITR SER IF: ABNORMAL TITER
CENTROMERE B AB SER-ACNC: ABNORMAL AI
CK SERPL-CCNC: 29 U/L (ref 20–239)
DSDNA AB SER-ACNC: 1 IU/ML
ENA JO1 AB SER IA-ACNC: ABNORMAL AI
ENA RNP AB SER-ACNC: ABNORMAL AI
ENA SCL70 AB SER IA-ACNC: ABNORMAL AI
ENA SM AB SER IA-ACNC: ABNORMAL AI
ENA SM+RNP AB SER IA-ACNC: ABNORMAL AI
ENA SS-A AB SER IA-ACNC: ABNORMAL AI
ENA SS-B AB SER IA-ACNC: ABNORMAL AI
ERYTHROCYTE [SEDIMENTATION RATE] IN BLOOD BY WESTERGREN METHOD: 2 MM/H
FERRITIN SERPL-MCNC: 52 NG/ML (ref 16–154)
FSH SERPL-ACNC: 7.1 MIU/ML
IRON SATN MFR SERPL: 43 % (CALC) (ref 16–45)
IRON SERPL-MCNC: 120 MCG/DL (ref 40–190)
LABORATORY COMMENT REPORT: ABNORMAL
LH SERPL-ACNC: 8.9 MIU/ML
NUCLEOSOME AB SER IA-ACNC: ABNORMAL AI
PHOSPHATE SERPL-MCNC: 3.6 MG/DL (ref 2.5–4.5)
PROLACTIN SERPL-MCNC: 16.6 NG/ML
RHEUMATOID FACT SERPL-ACNC: <10 IU/ML
RIBOSOMAL P AB SER-ACNC: ABNORMAL AI
T3FREE SERPL-MCNC: 3.7 PG/ML (ref 2.3–4.2)
TESTOST SERPL-MCNC: 30 NG/DL (ref 2–45)
TIBC SERPL-MCNC: 280 MCG/DL (CALC) (ref 250–450)
U DHEA SERPL-MCNC: 392 NG/DL
ZINC SERPL-MCNC: 63 MCG/DL (ref 60–130)

## 2025-08-07 ENCOUNTER — CLINICAL SUPPORT (OUTPATIENT)
Dept: PRIMARY CARE | Facility: CLINIC | Age: 40
End: 2025-08-07
Payer: COMMERCIAL

## 2025-08-07 ENCOUNTER — RESULTS FOLLOW-UP (OUTPATIENT)
Dept: PRIMARY CARE | Facility: CLINIC | Age: 40
End: 2025-08-07

## 2025-08-07 ENCOUNTER — TELEPHONE (OUTPATIENT)
Dept: PRIMARY CARE | Facility: CLINIC | Age: 40
End: 2025-08-07
Payer: COMMERCIAL

## 2025-08-07 DIAGNOSIS — R31.9 HEMATURIA, UNSPECIFIED TYPE: ICD-10-CM

## 2025-08-07 DIAGNOSIS — R10.84 GENERALIZED ABDOMINAL PAIN: Primary | ICD-10-CM

## 2025-08-07 LAB
POC APPEARANCE, URINE: CLEAR
POC BILIRUBIN, URINE: NEGATIVE
POC BLOOD, URINE: NEGATIVE
POC COLOR, URINE: YELLOW
POC GLUCOSE, URINE: NEGATIVE MG/DL
POC KETONES, URINE: NEGATIVE MG/DL
POC LEUKOCYTES, URINE: NEGATIVE
POC NITRITE,URINE: NEGATIVE
POC PH, URINE: 8.5 PH
POC PROTEIN, URINE: NEGATIVE MG/DL
POC SPECIFIC GRAVITY, URINE: 1.02
POC UROBILINOGEN, URINE: 0.2 EU/DL

## 2025-08-07 PROCEDURE — 81003 URINALYSIS AUTO W/O SCOPE: CPT | Performed by: NURSE PRACTITIONER

## 2025-08-19 PROCEDURE — RXMED WILLOW AMBULATORY MEDICATION CHARGE

## 2025-08-20 ENCOUNTER — SPECIALTY PHARMACY (OUTPATIENT)
Dept: PHARMACY | Facility: CLINIC | Age: 40
End: 2025-08-20

## 2025-08-22 ENCOUNTER — SPECIALTY PHARMACY (OUTPATIENT)
Dept: PHARMACY | Facility: CLINIC | Age: 40
End: 2025-08-22

## 2025-08-22 ENCOUNTER — PHARMACY VISIT (OUTPATIENT)
Dept: PHARMACY | Facility: CLINIC | Age: 40
End: 2025-08-22
Payer: COMMERCIAL

## 2025-08-28 ENCOUNTER — APPOINTMENT (OUTPATIENT)
Dept: NEUROLOGY | Facility: CLINIC | Age: 40
End: 2025-08-28
Payer: COMMERCIAL

## 2025-09-03 ENCOUNTER — OFFICE (OUTPATIENT)
Dept: URBAN - METROPOLITAN AREA CLINIC 27 | Facility: CLINIC | Age: 40
End: 2025-09-03
Payer: COMMERCIAL

## 2025-09-03 ENCOUNTER — APPOINTMENT (OUTPATIENT)
Dept: PRIMARY CARE | Facility: CLINIC | Age: 40
End: 2025-09-03
Payer: COMMERCIAL

## 2025-09-03 VITALS
HEIGHT: 65 IN | HEART RATE: 111 BPM | TEMPERATURE: 98.2 F | SYSTOLIC BLOOD PRESSURE: 146 MMHG | DIASTOLIC BLOOD PRESSURE: 86 MMHG | WEIGHT: 155 LBS

## 2025-09-03 DIAGNOSIS — Z80.0 FAMILY HISTORY OF MALIGNANT NEOPLASM OF DIGESTIVE ORGANS: ICD-10-CM

## 2025-09-03 DIAGNOSIS — R63.0 ANOREXIA: ICD-10-CM

## 2025-09-03 DIAGNOSIS — R19.4 CHANGE IN BOWEL HABIT: ICD-10-CM

## 2025-09-03 DIAGNOSIS — K20.0 EOSINOPHILIC ESOPHAGITIS: ICD-10-CM

## 2025-09-03 DIAGNOSIS — R10.13 EPIGASTRIC PAIN: ICD-10-CM

## 2025-09-03 DIAGNOSIS — R11.2 NAUSEA WITH VOMITING, UNSPECIFIED: ICD-10-CM

## 2025-09-03 PROCEDURE — 99214 OFFICE O/P EST MOD 30 MIN: CPT | Performed by: NURSE PRACTITIONER

## 2025-09-03 RX ORDER — PROMETHAZINE HYDROCHLORIDE 25 MG/1
TABLET ORAL
Qty: 3 | Refills: 0 | Status: ACTIVE
Start: 2025-09-03

## 2025-09-04 ENCOUNTER — AMBULATORY SURGICAL CENTER (OUTPATIENT)
Dept: URBAN - METROPOLITAN AREA SURGERY 12 | Facility: SURGERY | Age: 40
End: 2025-09-04
Payer: COMMERCIAL

## 2025-09-04 VITALS
RESPIRATION RATE: 14 BRPM | SYSTOLIC BLOOD PRESSURE: 104 MMHG | DIASTOLIC BLOOD PRESSURE: 47 MMHG | HEART RATE: 86 BPM | DIASTOLIC BLOOD PRESSURE: 81 MMHG | RESPIRATION RATE: 32 BRPM | HEART RATE: 87 BPM | DIASTOLIC BLOOD PRESSURE: 60 MMHG | SYSTOLIC BLOOD PRESSURE: 95 MMHG | HEART RATE: 84 BPM | SYSTOLIC BLOOD PRESSURE: 114 MMHG | HEART RATE: 85 BPM | SYSTOLIC BLOOD PRESSURE: 96 MMHG | RESPIRATION RATE: 15 BRPM | RESPIRATION RATE: 9 BRPM | SYSTOLIC BLOOD PRESSURE: 111 MMHG | SYSTOLIC BLOOD PRESSURE: 99 MMHG | WEIGHT: 148 LBS | SYSTOLIC BLOOD PRESSURE: 108 MMHG | HEART RATE: 66 BPM | HEART RATE: 96 BPM | OXYGEN SATURATION: 99 % | RESPIRATION RATE: 13 BRPM | DIASTOLIC BLOOD PRESSURE: 82 MMHG | DIASTOLIC BLOOD PRESSURE: 78 MMHG | RESPIRATION RATE: 12 BRPM | HEART RATE: 83 BPM | DIASTOLIC BLOOD PRESSURE: 64 MMHG | HEART RATE: 69 BPM | DIASTOLIC BLOOD PRESSURE: 61 MMHG | RESPIRATION RATE: 22 BRPM | OXYGEN SATURATION: 100 % | TEMPERATURE: 96.4 F | DIASTOLIC BLOOD PRESSURE: 71 MMHG | SYSTOLIC BLOOD PRESSURE: 93 MMHG | DIASTOLIC BLOOD PRESSURE: 57 MMHG | SYSTOLIC BLOOD PRESSURE: 85 MMHG | DIASTOLIC BLOOD PRESSURE: 75 MMHG | HEIGHT: 65 IN | RESPIRATION RATE: 20 BRPM | SYSTOLIC BLOOD PRESSURE: 86 MMHG | SYSTOLIC BLOOD PRESSURE: 112 MMHG | HEART RATE: 77 BPM | HEART RATE: 56 BPM | DIASTOLIC BLOOD PRESSURE: 52 MMHG

## 2025-09-04 DIAGNOSIS — K31.89 OTHER DISEASES OF STOMACH AND DUODENUM: ICD-10-CM

## 2025-09-04 DIAGNOSIS — K22.89 OTHER SPECIFIED DISEASE OF ESOPHAGUS: ICD-10-CM

## 2025-09-04 DIAGNOSIS — R11.2 NAUSEA WITH VOMITING, UNSPECIFIED: ICD-10-CM

## 2025-09-04 DIAGNOSIS — K21.9 GASTRO-ESOPHAGEAL REFLUX DISEASE WITHOUT ESOPHAGITIS: ICD-10-CM

## 2025-09-04 DIAGNOSIS — K44.9 DIAPHRAGMATIC HERNIA WITHOUT OBSTRUCTION OR GANGRENE: ICD-10-CM

## 2025-09-04 DIAGNOSIS — K22.2 ESOPHAGEAL OBSTRUCTION: ICD-10-CM

## 2025-09-04 PROCEDURE — 43450 DILATE ESOPHAGUS 1/MULT PASS: CPT | Performed by: INTERNAL MEDICINE

## 2025-09-04 PROCEDURE — 43239 EGD BIOPSY SINGLE/MULTIPLE: CPT | Performed by: INTERNAL MEDICINE

## 2025-10-07 ENCOUNTER — APPOINTMENT (OUTPATIENT)
Dept: NEUROLOGY | Facility: CLINIC | Age: 40
End: 2025-10-07
Payer: COMMERCIAL

## 2025-10-21 ENCOUNTER — APPOINTMENT (OUTPATIENT)
Dept: NEUROLOGY | Facility: CLINIC | Age: 40
End: 2025-10-21
Payer: COMMERCIAL

## 2025-10-28 ENCOUNTER — APPOINTMENT (OUTPATIENT)
Dept: RHEUMATOLOGY | Facility: CLINIC | Age: 40
End: 2025-10-28
Payer: COMMERCIAL

## 2026-07-27 ENCOUNTER — APPOINTMENT (OUTPATIENT)
Dept: PRIMARY CARE | Facility: CLINIC | Age: 41
End: 2026-07-27
Payer: COMMERCIAL